# Patient Record
Sex: MALE | Race: WHITE
[De-identification: names, ages, dates, MRNs, and addresses within clinical notes are randomized per-mention and may not be internally consistent; named-entity substitution may affect disease eponyms.]

---

## 2018-07-23 ENCOUNTER — HOSPITAL ENCOUNTER (OUTPATIENT)
Dept: HOSPITAL 84 - D.MRI | Age: 78
Discharge: HOME | End: 2018-07-23
Attending: ORTHOPAEDIC SURGERY
Payer: MEDICARE

## 2018-07-23 VITALS — BODY MASS INDEX: 33 KG/M2

## 2018-07-23 DIAGNOSIS — M75.102: Primary | ICD-10-CM

## 2018-08-16 LAB
ERYTHROCYTE [DISTWIDTH] IN BLOOD BY AUTOMATED COUNT: 13.5 % (ref 11.5–14.5)
HCT VFR BLD CALC: 41.5 % (ref 42–54)
HGB BLD-MCNC: 14.2 G/DL (ref 13.5–17.5)
MCH RBC QN AUTO: 33.9 PG (ref 26–34)
MCHC RBC AUTO-ENTMCNC: 34.2 G/DL (ref 31–37)
MCV RBC: 99 FL (ref 80–100)
PLATELET # BLD: 205 10X3/UL (ref 130–400)
PMV BLD AUTO: 10.5 FL (ref 7.4–10.4)
RBC # BLD AUTO: 4.19 10X6/UL (ref 4.2–6.1)
WBC # BLD AUTO: 7.7 10X3/UL (ref 4.8–10.8)

## 2018-08-17 ENCOUNTER — HOSPITAL ENCOUNTER (OUTPATIENT)
Dept: HOSPITAL 84 - D.OPS | Age: 78
Discharge: HOME | End: 2018-08-17
Attending: ORTHOPAEDIC SURGERY
Payer: MEDICARE

## 2018-08-17 VITALS — DIASTOLIC BLOOD PRESSURE: 77 MMHG | SYSTOLIC BLOOD PRESSURE: 152 MMHG

## 2018-08-17 VITALS — WEIGHT: 230 LBS | HEIGHT: 70 IN | BODY MASS INDEX: 32.93 KG/M2 | BODY MASS INDEX: 32.93 KG/M2

## 2018-08-17 VITALS — SYSTOLIC BLOOD PRESSURE: 152 MMHG | DIASTOLIC BLOOD PRESSURE: 77 MMHG

## 2018-08-17 DIAGNOSIS — Z01.812: ICD-10-CM

## 2018-08-17 DIAGNOSIS — S43.432A: ICD-10-CM

## 2018-08-17 DIAGNOSIS — M13.812: ICD-10-CM
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
## 2018-08-17 DIAGNOSIS — M75.112: Primary | ICD-10-CM

## 2018-11-20 ENCOUNTER — HOSPITAL ENCOUNTER (OUTPATIENT)
Dept: HOSPITAL 84 - D.OPS | Age: 78
Discharge: HOME | End: 2018-11-20
Attending: UROLOGY
Payer: MEDICARE

## 2018-11-20 VITALS — DIASTOLIC BLOOD PRESSURE: 75 MMHG | SYSTOLIC BLOOD PRESSURE: 149 MMHG

## 2018-11-20 VITALS — WEIGHT: 237 LBS | BODY MASS INDEX: 33.93 KG/M2 | HEIGHT: 70 IN

## 2018-11-20 DIAGNOSIS — R97.20: Primary | ICD-10-CM

## 2018-11-20 DIAGNOSIS — I10: ICD-10-CM

## 2018-11-20 LAB
ERYTHROCYTE [DISTWIDTH] IN BLOOD BY AUTOMATED COUNT: 14.5 % (ref 11.5–14.5)
HCT VFR BLD CALC: 43.3 % (ref 42–54)
HGB BLD-MCNC: 14.2 G/DL (ref 13.5–17.5)
MCH RBC QN AUTO: 32.7 PG (ref 26–34)
MCHC RBC AUTO-ENTMCNC: 32.8 G/DL (ref 31–37)
MCV RBC: 99.8 FL (ref 80–100)
PLATELET # BLD: 219 10X3/UL (ref 130–400)
PMV BLD AUTO: 10.6 FL (ref 7.4–10.4)
RBC # BLD AUTO: 4.34 10X6/UL (ref 4.2–6.1)
WBC # BLD AUTO: 7.4 10X3/UL (ref 4.8–10.8)

## 2019-01-10 ENCOUNTER — HOSPITAL ENCOUNTER (OUTPATIENT)
Dept: HOSPITAL 84 - D.NM | Age: 79
Discharge: HOME | End: 2019-01-10
Attending: UROLOGY
Payer: MEDICARE

## 2019-01-10 VITALS — BODY MASS INDEX: 34 KG/M2

## 2019-01-10 DIAGNOSIS — C61: Primary | ICD-10-CM

## 2019-12-09 ENCOUNTER — HOSPITAL ENCOUNTER (OUTPATIENT)
Dept: HOSPITAL 84 - D.LABREF | Age: 79
Discharge: HOME | End: 2019-12-09
Attending: ORTHOPAEDIC SURGERY
Payer: MEDICARE

## 2019-12-09 VITALS — BODY MASS INDEX: 34 KG/M2

## 2019-12-09 DIAGNOSIS — M16.11: Primary | ICD-10-CM

## 2019-12-11 ENCOUNTER — HOSPITAL ENCOUNTER (INPATIENT)
Dept: HOSPITAL 84 - D.SDCHOLD | Age: 79
LOS: 24 days | Discharge: NURSING, REHAB OR LONG-TERM CARE | DRG: 469 | End: 2020-01-04
Attending: ORTHOPAEDIC SURGERY | Admitting: ORTHOPAEDIC SURGERY
Payer: MEDICARE

## 2019-12-11 VITALS
BODY MASS INDEX: 35.07 KG/M2 | WEIGHT: 245 LBS | BODY MASS INDEX: 35.07 KG/M2 | WEIGHT: 245 LBS | HEIGHT: 70 IN | HEIGHT: 70 IN | BODY MASS INDEX: 35.07 KG/M2

## 2019-12-11 DIAGNOSIS — D62: ICD-10-CM

## 2019-12-11 DIAGNOSIS — I10: ICD-10-CM

## 2019-12-11 DIAGNOSIS — J18.9: ICD-10-CM

## 2019-12-11 DIAGNOSIS — M16.11: Primary | ICD-10-CM

## 2019-12-23 LAB
APPEARANCE UR: CLEAR
BACTERIA #/AREA URNS HPF: (no result) /HPF
BILIRUB SERPL-MCNC: NEGATIVE MG/DL
COLOR UR: YELLOW
GLUCOSE SERPL-MCNC: NEGATIVE MG/DL
GRAN CASTS #/AREA URNS LPF: (no result) /LPF
HYALINE CASTS #/AREA URNS LPF: (no result) /LPF
KETONES UR STRIP-MCNC: NEGATIVE MG/DL
MUCOUS THREADS #/AREA URNS LPF: (no result) /LPF
NITRITE UR-MCNC: NEGATIVE MG/ML
PH UR STRIP: 5 [PH] (ref 5–6)
PROT UR-MCNC: (no result) MG/DL
RBC #/AREA URNS HPF: (no result) /HPF (ref 0–5)
SP GR UR STRIP: 1.02 (ref 1–1.02)
SQUAMOUS #/AREA URNS HPF: (no result) /HPF (ref 0–5)
UROBILINOGEN UR-MCNC: NORMAL MG/DL
WBC #/AREA URNS HPF: (no result) /HPF

## 2019-12-31 VITALS — SYSTOLIC BLOOD PRESSURE: 123 MMHG | DIASTOLIC BLOOD PRESSURE: 53 MMHG

## 2019-12-31 VITALS — SYSTOLIC BLOOD PRESSURE: 101 MMHG | DIASTOLIC BLOOD PRESSURE: 46 MMHG

## 2019-12-31 VITALS — DIASTOLIC BLOOD PRESSURE: 48 MMHG | SYSTOLIC BLOOD PRESSURE: 102 MMHG

## 2019-12-31 VITALS — SYSTOLIC BLOOD PRESSURE: 95 MMHG | DIASTOLIC BLOOD PRESSURE: 55 MMHG

## 2019-12-31 VITALS — DIASTOLIC BLOOD PRESSURE: 80 MMHG | SYSTOLIC BLOOD PRESSURE: 133 MMHG

## 2019-12-31 VITALS — SYSTOLIC BLOOD PRESSURE: 130 MMHG | DIASTOLIC BLOOD PRESSURE: 54 MMHG

## 2019-12-31 VITALS — SYSTOLIC BLOOD PRESSURE: 111 MMHG | DIASTOLIC BLOOD PRESSURE: 43 MMHG

## 2019-12-31 VITALS — DIASTOLIC BLOOD PRESSURE: 53 MMHG | SYSTOLIC BLOOD PRESSURE: 126 MMHG

## 2019-12-31 VITALS — SYSTOLIC BLOOD PRESSURE: 152 MMHG | DIASTOLIC BLOOD PRESSURE: 70 MMHG

## 2019-12-31 LAB
ANION GAP SERPL CALC-SCNC: 12.6 MMOL/L (ref 8–16)
APTT BLD: 28.4 SECONDS (ref 22.8–39.4)
BASOPHILS NFR BLD AUTO: 0.6 % (ref 0–2)
BUN SERPL-MCNC: 23 MG/DL (ref 7–18)
CALCIUM SERPL-MCNC: 8.9 MG/DL (ref 8.5–10.1)
CHLORIDE SERPL-SCNC: 109 MMOL/L (ref 98–107)
CO2 SERPL-SCNC: 26.5 MMOL/L (ref 21–32)
CREAT SERPL-MCNC: 1 MG/DL (ref 0.6–1.3)
EOSINOPHIL NFR BLD: 3.2 % (ref 0–7)
ERYTHROCYTE [DISTWIDTH] IN BLOOD BY AUTOMATED COUNT: 13.9 % (ref 11.5–14.5)
GLUCOSE SERPL-MCNC: 94 MG/DL (ref 74–106)
HCT VFR BLD CALC: 39.5 % (ref 42–54)
HGB BLD-MCNC: 13.1 G/DL (ref 13.5–17.5)
IMM GRANULOCYTES NFR BLD: 0.5 % (ref 0–5)
INR PPP: 1.1 (ref 0.85–1.17)
LYMPHOCYTES NFR BLD AUTO: 33.3 % (ref 15–50)
MCH RBC QN AUTO: 33.5 PG (ref 26–34)
MCHC RBC AUTO-ENTMCNC: 33.2 G/DL (ref 31–37)
MCV RBC: 101 FL (ref 80–100)
MONOCYTES NFR BLD: 8.5 % (ref 2–11)
NEUTROPHILS NFR BLD AUTO: 53.9 % (ref 40–80)
OSMOLALITY SERPL CALC.SUM OF ELEC: 290 MOSM/KG (ref 275–300)
PLATELET # BLD: 222 10X3/UL (ref 130–400)
PMV BLD AUTO: 10.5 FL (ref 7.4–10.4)
POTASSIUM SERPL-SCNC: 4.1 MMOL/L (ref 3.5–5.1)
PROTHROMBIN TIME: 13.7 SECONDS (ref 11.6–15)
RBC # BLD AUTO: 3.91 10X6/UL (ref 4.2–6.1)
SODIUM SERPL-SCNC: 144 MMOL/L (ref 136–145)
WBC # BLD AUTO: 6.6 10X3/UL (ref 4.8–10.8)

## 2019-12-31 PROCEDURE — 0SR90J9 REPLACEMENT OF RIGHT HIP JOINT WITH SYNTHETIC SUBSTITUTE, CEMENTED, OPEN APPROACH: ICD-10-PCS | Performed by: ORTHOPAEDIC SURGERY

## 2019-12-31 NOTE — NUR
WHEN GIVING PATIENT NIGHT MEDS. PATIENT STATES THAT IT IS NORMAL FOR HIS HEART
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
DOCTOR SAID THIS IS OKAY.

## 2019-12-31 NOTE — NUR
PATIENT LYING DOWN SLEEPING IN BED. AROUSES EASILY TO VOICE. DRESSING ON R HIP
C/D/I. L HAND IV WITH 1/2 NS @100, NO REDNESS, OR SWELLING. 3L OF O2. PATIENT
DENIES ANY NEEDS AT THIS TIME. CALL LIGHT AND BEDSIDE TABLE WITHIN REACH.

## 2019-12-31 NOTE — NUR
RCVED PT VIA HOSPITAL STAFF AND BED FROM RECOVERY ROOM. PT ALERT AND ORIENTED
WITH NO S/S OF DISTRESS AT THIS TIME, DENIES NEEDS, WILL CONT TO MONITOR.

## 2020-01-01 VITALS — SYSTOLIC BLOOD PRESSURE: 128 MMHG | DIASTOLIC BLOOD PRESSURE: 59 MMHG

## 2020-01-01 VITALS — DIASTOLIC BLOOD PRESSURE: 54 MMHG | SYSTOLIC BLOOD PRESSURE: 118 MMHG

## 2020-01-01 VITALS — SYSTOLIC BLOOD PRESSURE: 124 MMHG | DIASTOLIC BLOOD PRESSURE: 59 MMHG

## 2020-01-01 VITALS — DIASTOLIC BLOOD PRESSURE: 51 MMHG | SYSTOLIC BLOOD PRESSURE: 118 MMHG

## 2020-01-01 VITALS — SYSTOLIC BLOOD PRESSURE: 135 MMHG | DIASTOLIC BLOOD PRESSURE: 63 MMHG

## 2020-01-01 VITALS — SYSTOLIC BLOOD PRESSURE: 118 MMHG | DIASTOLIC BLOOD PRESSURE: 51 MMHG

## 2020-01-01 VITALS — SYSTOLIC BLOOD PRESSURE: 144 MMHG | DIASTOLIC BLOOD PRESSURE: 57 MMHG

## 2020-01-01 LAB
ANION GAP SERPL CALC-SCNC: 13 MMOL/L (ref 8–16)
APPEARANCE UR: CLEAR
BASOPHILS NFR BLD AUTO: 0.2 % (ref 0–2)
BILIRUB SERPL-MCNC: NEGATIVE MG/DL
BUN SERPL-MCNC: 25 MG/DL (ref 7–18)
CALCIUM SERPL-MCNC: 8.1 MG/DL (ref 8.5–10.1)
CHLORIDE SERPL-SCNC: 107 MMOL/L (ref 98–107)
CO2 SERPL-SCNC: 26.7 MMOL/L (ref 21–32)
COLOR UR: YELLOW
CREAT SERPL-MCNC: 1.1 MG/DL (ref 0.6–1.3)
EOSINOPHIL NFR BLD: 0.1 % (ref 0–7)
ERYTHROCYTE [DISTWIDTH] IN BLOOD BY AUTOMATED COUNT: 13.9 % (ref 11.5–14.5)
GLUCOSE SERPL-MCNC: 119 MG/DL (ref 74–106)
GLUCOSE SERPL-MCNC: NEGATIVE MG/DL
HCT VFR BLD CALC: 34.2 % (ref 42–54)
HGB BLD-MCNC: 11.1 G/DL (ref 13.5–17.5)
IMM GRANULOCYTES NFR BLD: 0.3 % (ref 0–5)
KETONES UR STRIP-MCNC: NEGATIVE MG/DL
LYMPHOCYTES NFR BLD AUTO: 12.3 % (ref 15–50)
MAGNESIUM SERPL-MCNC: 1.9 MG/DL (ref 1.8–2.4)
MCH RBC QN AUTO: 33 PG (ref 26–34)
MCHC RBC AUTO-ENTMCNC: 32.5 G/DL (ref 31–37)
MCV RBC: 101.8 FL (ref 80–100)
MONOCYTES NFR BLD: 10.7 % (ref 2–11)
NEUTROPHILS NFR BLD AUTO: 76.4 % (ref 40–80)
NITRITE UR-MCNC: NEGATIVE MG/ML
OSMOLALITY SERPL CALC.SUM OF ELEC: 287 MOSM/KG (ref 275–300)
PH UR STRIP: 5 [PH] (ref 5–6)
PHOSPHATE SERPL-MCNC: 4.1 MG/DL (ref 2.5–4.9)
PLATELET # BLD: 218 10X3/UL (ref 130–400)
PMV BLD AUTO: 10.8 FL (ref 7.4–10.4)
POTASSIUM SERPL-SCNC: 4.7 MMOL/L (ref 3.5–5.1)
PROT UR-MCNC: (no result) MG/DL
RBC # BLD AUTO: 3.36 10X6/UL (ref 4.2–6.1)
SODIUM SERPL-SCNC: 142 MMOL/L (ref 136–145)
SP GR UR STRIP: 1.02 (ref 1–1.02)
UROBILINOGEN UR-MCNC: NORMAL MG/DL
WBC # BLD AUTO: 11.2 10X3/UL (ref 4.8–10.8)

## 2020-01-01 NOTE — NUR
CALLL LIGHT ANSWERED. PT DROPPED URINAL ON GROUND. PLACED IT BACK WITHIN
REACH. DENIES FURTHER NEEDS OR PAIN AT THIS TIME. CALL LIGHT ALSO WITHIN
REACH. BED IN LOWEST POSITION. PT REPOSITIONED TO COMFORT. WILL CONTINUE TO
MONITOR.

## 2020-01-01 NOTE — NUR
PT ASSISTED BACK TO BED WITH WALKER. STEADY GAIT NOTED. WATER RECIEVED PER
REQUEST. DENIES FURTHER NEEDS OR PAIN AT THIS TIME. REPOSITIONED TO COMFORT.
RECIVED FRESH ICE PACK FOR HIP. CALL LIGHT WITHIN REACH. BED IN LOWEST
POSITION.WILL CONTINUE TO MONITOR.

## 2020-01-01 NOTE — NUR
LYING IN BED. ALERT AND ORIENTED X4. RATES PAIN IN RT HIP AS 1 UNLESS MOVING
AND THEN HE STATES ITS A 10. DENIES NEED FOR PAIN MED. DRSG C/D/I TO RT HIP.
RESP NONLABORED. O2 @ 3L/NC. SCDS IN USE BILAT. [place]. 1/2 NS @ 50 MLHR INFUSING
IN LT HAND. ABD DISTENDED. HX OF ALCOHOL USE. NO DISTRESS. POSEY ALARM ON. SR
ELEVATED X2. CL IN REACH.

## 2020-01-01 NOTE — OP
PATIENT NAME:  LACEY AVINA                   MEDICAL RECORD: [id number]
:44                                             LOCATION:D.MS [id number]
                                                         ADMISSION DATE:19
SURGEON:  BACILIO TRIMBLE DO         
 
 
DATE OF OPERATION:  2019
 
PROCEDURE PERFORMED:  Right total hip arthroplasty.
 
PREOPERATIVE DIAGNOSIS:  Right hip osteoarthritis.
 
POSTOPERATIVE DIAGNOSIS:  Right hip osteoarthritis.
 
INDICATIONS:  Mr. Avina is a 79-year-old male who has had right hip pain
for quite some time and is tired of dealing with it and had good luck with
injections in the past and wants something done surgically.  He is tired of it
affecting his activities of daily living and wants something done.  I informed
her of the risks including infection, bleeding, damage to nerves and vessels,
anterior lateral thigh numbness, fracture, need for further surgery, failure of
implants, blood clots, and even death and he signed the consent.
 
SURGEON:  Bacilio Trimble DO
 
ASSISTANT:  Assisted by Prabhjot Lanier, advanced nurse practitioner and Zaid Aguero, certified surgical first assistant, both assisted with retraction and
closing.  The surgery could not have been performed without their assistance.
 
DESCRIPTION OF THE PROCEDURE:  The patient was taken to the operative suite,
laid in supine position, given 80 mg gentamicin and 2 grams of Ancef.  He was
then sedated and intubated and then moved over to the Paisley table.  The right hip
was prepped and draped in sterile fashion.  Timeout was performed and everybody
was in agreement with the correct side, site, patient, and procedure.
 
Incision was then marked out and cut down to the fascia and tensor fasciae latae
muscle.  Fascia was taken anteriorly with the muscle belly posteriorly.  I
opened up the rectus interval and fascia was then opened, then the rectus was
taken medially and the tensor fascia laterally.  The ascending branches of the
lateral femoral circumflex were encountered, tied off, and coagulated with
Aquamantys.  We then put a Hohmann around the neck of the femur.  The capsule
was then opened and the neck cut was made.  This was then removed.  The head of
the femur was then removed.  Labrum was removed off the acetabulum as well as
the Pulvinar and the inner portion of it and then reamed first with a #48 and up
to a #54.  We needed to medialize some more, we then went back with a #50 to
medialize up to a 54.  A #54 cup was then impacted into place.  The liner was
put in.  The femur was exposed and found the canal with the canal finder and
then the cookie cutter posteriorly.  The broaching began up to a 13.  This was
trialed and then fit well with a -3 neck.  This was taken out.  The #13 was a
little loose.  A #14 was put down, #14 fit very well, #14 stem was then placed
and -3 neck was put on with a high offset.  This was then reduced and had good
length and fit the femur very well.  The site was then irrigated thoroughly and
then the povidone-iodine solution 7% with 500 mL normal saline was introduced
through the site and was left for 3 minutes and irrigated out with a liter of
normal saline and then put tobramycin and vancomycin powder as well as Ernestina
powder in the site and closed the tensor fascia val fascia with #1 Vicryl,
first in a figure-of-eight and then a running locking stitch.  The skin was
closed with 2-0 Vicryl in an inverted interrupted fashion and 4-0 Monocryl to
the skin.  Prineo glue on the skin and dressed with Telfa and Tegaderm.  He was
 
 
 
OPERATIVE REPORT                               [id number]    LACEY AVINA 
 
 
awakened and taken to the recovery in stable condition.
 
Blood loss was approximately 200 mL.
 
COMPLICATIONS:  None.
 
TRANSINT:[id number] Voice Confirmation ID: [id number] DOCUMENT ID: [id number]
                                           
                                           BACILIO TRIMBLE DO         
 
 
 
Electronically Signed by BACILIO MUELLER on 20 at 1107
 
 
 
 
 
 
 
 
 
 
 
 
 
 
 
 
 
 
 
 
 
 
 
 
 
 
 
 
 
 
 
 
 
 
CC:                                                             [id number]
DICTATION DATE: 19 1350     :     19 0134      ADM IN  
                                                                              
[place] [id number] [place] [id number]

## 2020-01-01 NOTE — NUR
PT RESTING, RR EVEN AND UNLABORED. DENIES NEEDS OR PAIN AT THIS TIME. APN AT
BEDSIDE. CALL LIGHT WITHIN REACH. BED IN LOWEST POSITION. WILL CONTINUE TO
MONITOR.

## 2020-01-02 VITALS — DIASTOLIC BLOOD PRESSURE: 61 MMHG | SYSTOLIC BLOOD PRESSURE: 129 MMHG

## 2020-01-02 VITALS — SYSTOLIC BLOOD PRESSURE: 135 MMHG | DIASTOLIC BLOOD PRESSURE: 57 MMHG

## 2020-01-02 VITALS — SYSTOLIC BLOOD PRESSURE: 131 MMHG | DIASTOLIC BLOOD PRESSURE: 55 MMHG

## 2020-01-02 VITALS — DIASTOLIC BLOOD PRESSURE: 50 MMHG | SYSTOLIC BLOOD PRESSURE: 137 MMHG

## 2020-01-02 LAB
ANION GAP SERPL CALC-SCNC: 14.7 MMOL/L (ref 8–16)
BASOPHILS NFR BLD AUTO: 0.2 % (ref 0–2)
BUN SERPL-MCNC: 18 MG/DL (ref 7–18)
CALCIUM SERPL-MCNC: 8 MG/DL (ref 8.5–10.1)
CHLORIDE SERPL-SCNC: 106 MMOL/L (ref 98–107)
CO2 SERPL-SCNC: 24.1 MMOL/L (ref 21–32)
CREAT SERPL-MCNC: 0.9 MG/DL (ref 0.6–1.3)
EOSINOPHIL NFR BLD: 1 % (ref 0–7)
ERYTHROCYTE [DISTWIDTH] IN BLOOD BY AUTOMATED COUNT: 14.2 % (ref 11.5–14.5)
GLUCOSE SERPL-MCNC: 109 MG/DL (ref 74–106)
HCT VFR BLD CALC: 33 % (ref 42–54)
HGB BLD-MCNC: 10.6 G/DL (ref 13.5–17.5)
IMM GRANULOCYTES NFR BLD: 0.3 % (ref 0–5)
LYMPHOCYTES NFR BLD AUTO: 13.7 % (ref 15–50)
MAGNESIUM SERPL-MCNC: 1.9 MG/DL (ref 1.8–2.4)
MCH RBC QN AUTO: 32.6 PG (ref 26–34)
MCHC RBC AUTO-ENTMCNC: 32.1 G/DL (ref 31–37)
MCV RBC: 101.5 FL (ref 80–100)
MONOCYTES NFR BLD: 13 % (ref 2–11)
NEUTROPHILS NFR BLD AUTO: 71.8 % (ref 40–80)
OSMOLALITY SERPL CALC.SUM OF ELEC: 283 MOSM/KG (ref 275–300)
PHOSPHATE SERPL-MCNC: 2.8 MG/DL (ref 2.5–4.9)
PLATELET # BLD: 193 10X3/UL (ref 130–400)
PMV BLD AUTO: 10.8 FL (ref 7.4–10.4)
POTASSIUM SERPL-SCNC: 3.8 MMOL/L (ref 3.5–5.1)
RBC # BLD AUTO: 3.25 10X6/UL (ref 4.2–6.1)
SODIUM SERPL-SCNC: 141 MMOL/L (ref 136–145)
WBC # BLD AUTO: 12.1 10X3/UL (ref 4.8–10.8)

## 2020-01-02 NOTE — NUR
ALERT AND ORIENTED, RESTING IN BED WITH EYES OPEN. NO C/O PAIN. NO S/S OF
ACUTE DISTRESS NOTED. AGITATED THIS MORNING. [place]. POD #2 RIGHT HIP, DRESSING
C/D/I. UP WITH WALKER. ON 3L O2, NC. IV TO LEFT HAND, 1/2 NS INFUSING @
50ML/HR. SITE PATENT WITHOUT REDNESS OR SWELLING. POSEY ALARM ON. SCDS
PRESENT. DENIES ANY NEEDS AT THIS TIME. CALL LIGHT IN REACH. WILL CONTINUE TO
MONITOR.

## 2020-01-02 NOTE — NUR
ALERT AND ORIENTED X4. LYING IN BED. IRRITABLE AT TIMES, DOESNT LIKE BEING
DISTURBED. RESP NONLABORED. O2 @2LNC. SCDS IN USE BILAT. DRSG NOTED TO RT HIP
IS C/D/I. [place]. POSEY ALARM ON FOR PT SAFETY. 1/2 NS @ 30 MLHR INFUSING IN LT
HAND. AMB WITH WALKER WITH ASSIST X1. DENIES PAIN AT THIS TIME. CL IN REACH.

## 2020-01-02 NOTE — NUR
ALERT AND ORIENTED, RESTING IN BED WITH EYES OPEN. NO C/O PAIN. NO S/S OF
ACUTE DISTRESS NOTED. DENIES ANY NEEDS AT THIS TIME. CALL LIGHT IN REACH. WILL
CONTINUE TO MONITOR.

## 2020-01-03 VITALS — DIASTOLIC BLOOD PRESSURE: 61 MMHG | SYSTOLIC BLOOD PRESSURE: 124 MMHG

## 2020-01-03 VITALS — DIASTOLIC BLOOD PRESSURE: 62 MMHG | SYSTOLIC BLOOD PRESSURE: 133 MMHG

## 2020-01-03 VITALS — DIASTOLIC BLOOD PRESSURE: 65 MMHG | SYSTOLIC BLOOD PRESSURE: 127 MMHG

## 2020-01-03 VITALS — DIASTOLIC BLOOD PRESSURE: 61 MMHG | SYSTOLIC BLOOD PRESSURE: 133 MMHG

## 2020-01-03 VITALS — DIASTOLIC BLOOD PRESSURE: 64 MMHG | SYSTOLIC BLOOD PRESSURE: 136 MMHG

## 2020-01-03 LAB
ANION GAP SERPL CALC-SCNC: 12.8 MMOL/L (ref 8–16)
BASOPHILS NFR BLD AUTO: 0.1 % (ref 0–2)
BUN SERPL-MCNC: 20 MG/DL (ref 7–18)
CALCIUM SERPL-MCNC: 8.4 MG/DL (ref 8.5–10.1)
CHLORIDE SERPL-SCNC: 107 MMOL/L (ref 98–107)
CO2 SERPL-SCNC: 26 MMOL/L (ref 21–32)
CREAT SERPL-MCNC: 1.1 MG/DL (ref 0.6–1.3)
EOSINOPHIL NFR BLD: 2.2 % (ref 0–7)
ERYTHROCYTE [DISTWIDTH] IN BLOOD BY AUTOMATED COUNT: 14.1 % (ref 11.5–14.5)
FERRITIN SERPL-MCNC: 211 NG/ML (ref 3–244)
GLUCOSE SERPL-MCNC: 109 MG/DL (ref 74–106)
HCT VFR BLD CALC: 31.2 % (ref 42–54)
HGB BLD-MCNC: 10.1 G/DL (ref 13.5–17.5)
IMM GRANULOCYTES NFR BLD: 0.3 % (ref 0–5)
IRON SERPL-MCNC: 16 UG/DL (ref 35–150)
LYMPHOCYTES NFR BLD AUTO: 21.4 % (ref 15–50)
MAGNESIUM SERPL-MCNC: 2 MG/DL (ref 1.8–2.4)
MCH RBC QN AUTO: 33 PG (ref 26–34)
MCHC RBC AUTO-ENTMCNC: 32.4 G/DL (ref 31–37)
MCV RBC: 102 FL (ref 80–100)
MONOCYTES NFR BLD: 13.1 % (ref 2–11)
NEUTROPHILS NFR BLD AUTO: 62.9 % (ref 40–80)
OSMOLALITY SERPL CALC.SUM OF ELEC: 286 MOSM/KG (ref 275–300)
PHOSPHATE SERPL-MCNC: 2.8 MG/DL (ref 2.5–4.9)
PLATELET # BLD: 193 10X3/UL (ref 130–400)
PMV BLD AUTO: 10.7 FL (ref 7.4–10.4)
POTASSIUM SERPL-SCNC: 3.8 MMOL/L (ref 3.5–5.1)
RBC # BLD AUTO: 3.06 10X6/UL (ref 4.2–6.1)
SAO2 % BLD FROM PO2: 9 % (ref 15–55)
SODIUM SERPL-SCNC: 142 MMOL/L (ref 136–145)
TIBC SERPL-MCNC: 174 UG/DL (ref 260–445)
UIBC SERPL-MCNC: 158 UG/DL (ref 150–375)
WBC # BLD AUTO: 11 10X3/UL (ref 4.8–10.8)

## 2020-01-03 NOTE — NUR
PT SITTING UP IN BED WITHOUT DISTRESS, AOX4. IV LEFT HAND INFUSING 1/2NS @ 50.
O2 2L/NC. SCDS IN PLACE BILAT. BED ALARM ON. DENIES NEEDS AT THIS TIME. CL IN
REACH, WILL CTM

## 2020-01-03 NOTE — MORECARE
CASE MANAGEMENT DISCHARGE SUMMARY
 
 
PATIENT: LACEY AVINA             UNIT: [id number]
ACCOUNT#: [id number]                       ADM DATE: 19
AGE: 79     : 44  SEX: M            ROOM/BED: D.2208    
AUTHOR: BE STARKS                             PHYSICIAN:                               
 
REFERRING PHYSICIAN: CECY TRIMBLE DO         
DATE OF SERVICE: 20
Discharge Plan
 
 
Patient Name: LACEY AVINA
Facility: [facility]:[place]
Encounter #: [id number]
Medical Record #: [id number]
: 1940
Planned Disposition: Inpatient Rehab
Anticipated Discharge Date: 1/3/20
 
Discharge Date: 
Expected LOS: 3
Initial Reviewer: [email or web address]
Initial Review Date: 2020
Generated: 1/3/20  12:54 pm 
Comments
 
DCP- Discharge Planning
 
Updated by [id number]: Marjorie Sotelo on 1/3/20  10:47 am CT
Patient Name: LACEY AVINA                                     
Admission Status: Elective   
Accout number: [id number]                              
Admission Date: 2019   
: 1940                                                        
Admission Diagnosis:   
Attending: CECY TRIMBLE                                                
Current LOS:  3   
  
Anticipated DC Date: 2020   
Planned Disposition: Inpatient Rehab   
Primary Insurance: MEDICARE A & B   
  
  
Discharge Planning Comments: CM met with patient to complete initial dc 
planning assessment.  CM educated patient on the CM role and verbal consent 
given by patient to complete assessment.   Patient lives at home with his 
spouse. He states he is independent with all ADL's and AIDL's. States he 
wears oxygen at night, receives oxygen supplies from [facility] CM discussed 
 
availability of home health, rehab services, and medical equipment. Patient 
states he would like to go to inpatient rehab at [facility]. I have placed a rehab 
screen and spoke with [facility]. [facility] states they can accept today if 
discharged. I messaged Dr. Trimble of the same. CM will continue to follow 
and will assist as needed with dc plans/needs.    
  
  
  
  
  
  
: Marjorie Sotelo
 DCPIA - Discharge Planning Initial Assessment
 
Updated by [id number]: Marjorie Sotelo on 1/3/20  11:45 am
*  Is the patient Alert and Oriented?
Yes
*  How many steps to enter\exit or inside your home? 0/0 *  PCP Dr. Woodard *  Pharmacy
[facility] [address]
*  Preadmission Environment
Home with Family
*  ADLs
Independent
*  Equipment
Oxygen
*  List name and contact numbers for known caregivers / representatives who 
currently or will assist patient after discharge:
Meaghan Avina - spouse - [phone]
*  Verbal permission to speak to the caregivers and representatives has been 
obtained from the patient.
Yes
*  Community resources currently utilized
None
*  Additional services required to return to the preadmission environment?
Yes
*  Can the patient safely return to the preadmission environment?
Yes
*  Has this patient been hospitalized within the prior 30 days at any 
hospital?
No
 
 
 
 
 
Coverage Notice
 
Reviewer: ZEUS Sotelo
 
Notice Issued Date-Time: 2020  11:47
Notice Type: IM Discharge Notice
 
 
Notice Delivered To: Patient
Relationship to Patient: Self
Representative Name: 
 
Delivery Method: HAND - Hand Delivered
Jeanne Days:
Prior Verbal Notification: 
 
Recipient Understood Notice: Yes
Recipient Signature: Yes
Med Rec Note Co-signed by Attending:
 
Coverage Notice Comment:  IMM explained, signed, given, copy placed in Mr
Reviewer: [id number] Sotelo
 
Notice Issued Date-Time: 2020  11:47
Notice Type: Patient Choice Letter
 
Notice Delivered To: Patient
Relationship to Patient: Self
Representative Name: 
 
Delivery Method: HAND - Hand Delivered
Jeanne Days:
Prior Verbal Notification: 
 
Recipient Understood Notice: Yes
Recipient Signature: Yes
Med Rec Note Co-signed by Attending:
 
Coverage Notice Comment:  [facility] for [facility] inpatient rehab
 
Last DP export: 1/3/20  10:47 am
Patient Name: LACEY AVINA
 
Encounter #: [id number]
Page [id number]
 
 
 
 
 
Electronically Signed by BE STARKS on 20 at 1155
 
 
 
 
 
 
**All edits/amendments must be made on the electronic document**
 
DICTATION DATE: 20 1154     : JESSICA  20 1154     
RPT#: [id number]                                DC DATE:        
                                               STATUS: ADM IN  
[facility]
[address] [place] [address]
***END OF REPORT***

## 2020-01-03 NOTE — MORECARE
CASE MANAGEMENT DISCHARGE SUMMARY
 
 
PATIENT: LACEY LANDON             UNIT: [id number]
ACCOUNT#: [id number]                       ADM DATE: 19
AGE: 79     : 44  SEX: M            ROOM/BED: D.2208    
AUTHOR: BE STARKS                             PHYSICIAN:                               
 
REFERRING PHYSICIAN: CECY TRIMBLE DO         
DATE OF SERVICE: 20
Discharge Plan
 
 
Patient Name: LACEY LANDON
[facility]
Encounter #: [id number]
Medical Record #: [id number]
: 1940
Planned Disposition: Inpatient Rehab
Anticipated Discharge Date: 1/3/20
 
Discharge Date: 
Expected LOS: 3
Initial Reviewer: [email or web address]
Initial Review Date: 2020
Generated: 1/3/20  12:40 pm 
  
 
 
 
 
 
 
 
Patient Name: LACEY LANDON
 
Encounter #: [id number]
Page [id number]
 
 
 
 
 
Electronically Signed by BE STARKS on 20 at 1140
 
 
 
 
 
 
**All edits/amendments must be made on the electronic document**
 
DICTATION DATE: 20 1140     : JESSICA  20 1140     
RPT#: [id number]                                DC DATE:        
                                               STATUS: ADM IN  
[facility]
 [place] [address]
***END OF REPORT***

## 2020-01-03 NOTE — MORECARE
CASE MANAGEMENT DISCHARGE SUMMARY
 
 
PATIENT: LACEY AVINA             UNIT: [id number]
ACCOUNT#: [id number]                       ADM DATE: 19
AGE: 79     : 44  SEX: M            ROOM/BED: D.2208    
AUTHOR: BE STARKS                             PHYSICIAN:                               
 
REFERRING PHYSICIAN: CECY TRIMBLE DO         
DATE OF SERVICE: 20
Discharge Plan
 
 
Patient Name: LACEY AVINA
Facility: [facility]
Encounter #: [id number]
Medical Record #: [id number]
: 1940
Planned Disposition: Inpatient Rehab
Anticipated Discharge Date: 1/3/20
 
Discharge Date: 
Expected LOS: 3
Initial Reviewer: [email or web address]
Initial Review Date: 2020
Generated: 1/3/20   5:35 pm 
Comments
 
DCP- Discharge Planning
 
Updated by [id number]: Marjorie Sotelo on 1/3/20   3:24 pm CT
I spoke with [facility] in inpatient rehab, they will accept the patient tomorrow.
 Anticipate discharge tomorrow to inpatient rehab.
DCP- Discharge Planning
 
Updated by [id number]: Marjorie Sotelo on 1/3/20  10:47 am CT
Patient Name: LACEY AVINA                                     
Admission Status: Elective   
Accout number: [id number]                              
Admission Date: 2019   
: 1940                                                        
Admission Diagnosis:   
Attending: CECY TRIMBLE                                                
Current LOS:  3   
  
Anticipated DC Date: 2020   
Planned Disposition: Inpatient Rehab   
Primary Insurance: MEDICARE A & B   
  
  
 
Discharge Planning Comments: CM met with patient to complete initial dc 
planning assessment.  CM educated patient on the CM role and verbal consent 
given by patient to complete assessment.   Patient lives at home with his 
spouse. He states he is independent with all ADL's and AIDL's. States he 
wears oxygen at night, receives oxygen supplies from [facility] CM discussed 
availability of home health, rehab services, and medical equipment. Patient 
states he would like to go to inpatient rehab at [facility]. I have placed a rehab 
screen and spoke with [facility]. [facility] states they can accept today if 
discharged. I messaged Dr. Trimble of the same. CM will continue to follow 
and will assist as needed with dc plans/needs.    
  
  
  
  
  
  
: Marjorie Sotelo
 DCPIA - Discharge Planning Initial Assessment
 
Updated by [id number]: Marjorie Sotelo on 1/3/20  11:45 am
*  Is the patient Alert and Oriented?
Yes
*  How many steps to enter\exit or inside your home? 0/0 *  PCP Dr. Woodard *  Pharmacy
[facility] [address]
*  Preadmission Environment
Home with Family
*  ADLs
Independent
*  Equipment
Oxygen
*  List name and contact numbers for known caregivers / representatives who 
currently or will assist patient after discharge:
Meaghan Avina - [facility] - [phone]
*  Verbal permission to speak to the caregivers and representatives has been 
obtained from the patient.
Yes
*  Community resources currently utilized
None
*  Additional services required to return to the preadmission environment?
Yes
*  Can the patient safely return to the preadmission environment?
Yes
*  Has this patient been hospitalized within the prior 30 days at any 
hospital?
No
 
 
 
 
 
Coverage Notice
 
 
Reviewer: [email or web address] Leslie Sotelo
 
Notice Issued Date-Time: 2020  11:47
Notice Type: IM Discharge Notice
 
Notice Delivered To: Patient
Relationship to Patient: Self
Representative Name: 
 
Delivery Method: HAND - Hand Delivered
Jeanne Days:
Prior Verbal Notification: 
 
Recipient Understood Notice: Yes
Recipient Signature: Yes
Med Rec Note Co-signed by Attending:
 
Coverage Notice Comment:  IMM explained, signed, given, copy placed in Mr
Reviewer: [email or web address] Leslie Perezy Deepak
 
Notice Issued Date-Time: 2020  11:47
Notice Type: Patient Choice Letter
 
Notice Delivered To: Patient
Relationship to Patient: Self
Representative Name: 
 
Delivery Method: HAND - Hand Delivered
Jeanne Days:
Prior Verbal Notification: 
 
Recipient Understood Notice: Yes
Recipient Signature: Yes
Med Rec Note Co-signed by Attending:
 
Coverage Notice Comment:  [facility] for [facility] inpatient rehab
 
Last DP export: 1/3/20  10:55 am
Patient Name: LACEY AVINA
 
Encounter #: [id number]
Page [id number]
 
 
 
 
 
Electronically Signed by BE STARKS on 20 at 1635
 
 
 
 
 
 
**All edits/amendments must be made on the electronic document**
 
DICTATION DATE: 20 1635     : JESSICA  20 1635     
RPT#: [id number]                                DC DATE:        
                                               STATUS: ADM IN  
[facility]
[address] [place] [address]
***END OF REPORT***

## 2020-01-03 NOTE — MORECARE
CASE MANAGEMENT DISCHARGE SUMMARY
 
 
PATIENT: LACEY AVINA             UNIT: [id number]
ACCOUNT#: [id number]                       ADM DATE: 19
AGE: 79     : 44  SEX: M            ROOM/BED: D.2208    
AUTHOR: BE STARKS                             PHYSICIAN:                               
 
REFERRING PHYSICIAN: CECY TRIMBLE DO         
DATE OF SERVICE: 20
Discharge Plan
 
 
Patient Name: LACEY AVINA
[facility]
Encounter #: [id number]
Medical Record #: [id number]
: 1940
Planned Disposition: Inpatient Rehab
Anticipated Discharge Date: 1/3/20
 
Discharge Date: 
Expected LOS: 3
Initial Reviewer: [email or web address]
Initial Review Date: 2020
Generated: 1/3/20  12:46 pm 
 DCPIA - Discharge Planning Initial Assessment
 
Updated by [id number]: Marjorie Sotelo on 1/3/20  11:45 am
*  Is the patient Alert and Oriented?
Yes
*  How many steps to enter\exit or inside your home? 0/0 *  PCP Dr. Woodard *  Pharmacy
[facility] on [address]
*  Preadmission Environment
Home with Family
*  ADLs
Independent
*  Equipment
Oxygen
*  List name and contact numbers for known caregivers / representatives who 
currently or will assist patient after discharge:
Meaghan Avina - spouse - [phone]
*  Verbal permission to speak to the caregivers and representatives has been 
obtained from the patient.
Yes
*  Community resources currently utilized
None
*  Additional services required to return to the preadmission environment?
Yes
*  Can the patient safely return to the preadmission environment?
 
Yes
*  Has this patient been hospitalized within the prior 30 days at any 
hospital?
No
 
 
 
 
 
 
 
Last DP export: 1/3/20  10:40 am
Patient Name: LACEY AVINA
Encounter #: [id number]
Page [id number]
 
 
 
 
 
Electronically Signed by BE STARKS on 20 at 1147
 
 
 
 
 
 
**All edits/amendments must be made on the electronic document**
 
DICTATION DATE: 20 1146     : JESSICA  20 1146     
RPT#: [id number]                                DC DATE:        
                                               STATUS: ADM IN  
[facility]
[address] [place] [address]
***END OF REPORT***

## 2020-01-03 NOTE — NUR
RESTING IN BED, NO DISTRESS NOTED, SPILLED URINAL IN BED, DIFFICULT TO TURN,
CONT TO MONITOR, DRESSING DRY AND INTACT TO RIGHT HIP

## 2020-01-03 NOTE — NUR
PT REQUESTED PAIN MED FOR PAIN 6/10 IN HIP, GAVE NORCO AS ORDERED. DENIES
OTHER NEEDS. CL IN REACH, WILL CTM

## 2020-01-04 ENCOUNTER — HOSPITAL ENCOUNTER (INPATIENT)
Dept: HOSPITAL 84 - D.REHAB | Age: 80
LOS: 10 days | Discharge: HOME HEALTH | DRG: 559 | End: 2020-01-14
Attending: FAMILY MEDICINE | Admitting: FAMILY MEDICINE
Payer: MEDICARE

## 2020-01-04 VITALS
WEIGHT: 245 LBS | BODY MASS INDEX: 35.07 KG/M2 | HEIGHT: 70 IN | BODY MASS INDEX: 35.07 KG/M2 | HEIGHT: 70 IN | BODY MASS INDEX: 35.07 KG/M2 | WEIGHT: 245 LBS

## 2020-01-04 VITALS — DIASTOLIC BLOOD PRESSURE: 67 MMHG | SYSTOLIC BLOOD PRESSURE: 136 MMHG

## 2020-01-04 VITALS — DIASTOLIC BLOOD PRESSURE: 60 MMHG | SYSTOLIC BLOOD PRESSURE: 154 MMHG

## 2020-01-04 VITALS — SYSTOLIC BLOOD PRESSURE: 143 MMHG | DIASTOLIC BLOOD PRESSURE: 95 MMHG

## 2020-01-04 DIAGNOSIS — E87.70: ICD-10-CM

## 2020-01-04 DIAGNOSIS — R73.9: ICD-10-CM

## 2020-01-04 DIAGNOSIS — R09.02: ICD-10-CM

## 2020-01-04 DIAGNOSIS — J18.1: ICD-10-CM

## 2020-01-04 DIAGNOSIS — D62: ICD-10-CM

## 2020-01-04 DIAGNOSIS — E46: ICD-10-CM

## 2020-01-04 DIAGNOSIS — E87.8: ICD-10-CM

## 2020-01-04 DIAGNOSIS — R63.0: ICD-10-CM

## 2020-01-04 DIAGNOSIS — I49.9: ICD-10-CM

## 2020-01-04 DIAGNOSIS — I82.409: ICD-10-CM

## 2020-01-04 DIAGNOSIS — Z96.641: ICD-10-CM

## 2020-01-04 DIAGNOSIS — G47.33: ICD-10-CM

## 2020-01-04 DIAGNOSIS — M16.11: ICD-10-CM

## 2020-01-04 DIAGNOSIS — Z91.81: ICD-10-CM

## 2020-01-04 DIAGNOSIS — Z47.1: Primary | ICD-10-CM

## 2020-01-04 DIAGNOSIS — F41.8: ICD-10-CM

## 2020-01-04 DIAGNOSIS — I16.0: ICD-10-CM

## 2020-01-04 DIAGNOSIS — R06.03: ICD-10-CM

## 2020-01-04 DIAGNOSIS — E86.0: ICD-10-CM

## 2020-01-04 LAB
ANION GAP SERPL CALC-SCNC: 12.7 MMOL/L (ref 8–16)
BASOPHILS NFR BLD AUTO: 0.1 % (ref 0–2)
BUN SERPL-MCNC: 20 MG/DL (ref 7–18)
CALCIUM SERPL-MCNC: 8.7 MG/DL (ref 8.5–10.1)
CHLORIDE SERPL-SCNC: 108 MMOL/L (ref 98–107)
CO2 SERPL-SCNC: 25.2 MMOL/L (ref 21–32)
CREAT SERPL-MCNC: 1 MG/DL (ref 0.6–1.3)
EOSINOPHIL NFR BLD: 4 % (ref 0–7)
ERYTHROCYTE [DISTWIDTH] IN BLOOD BY AUTOMATED COUNT: 14.1 % (ref 11.5–14.5)
GLUCOSE SERPL-MCNC: 102 MG/DL (ref 74–106)
HCT VFR BLD CALC: 31.5 % (ref 42–54)
HGB BLD-MCNC: 10.2 G/DL (ref 13.5–17.5)
IMM GRANULOCYTES NFR BLD: 0.5 % (ref 0–5)
LYMPHOCYTES NFR BLD AUTO: 21.8 % (ref 15–50)
MAGNESIUM SERPL-MCNC: 2.1 MG/DL (ref 1.8–2.4)
MCH RBC QN AUTO: 33.1 PG (ref 26–34)
MCHC RBC AUTO-ENTMCNC: 32.4 G/DL (ref 31–37)
MCV RBC: 102.3 FL (ref 80–100)
MONOCYTES NFR BLD: 12.6 % (ref 2–11)
NEUTROPHILS NFR BLD AUTO: 61 % (ref 40–80)
OSMOLALITY SERPL CALC.SUM OF ELEC: 285 MOSM/KG (ref 275–300)
PHOSPHATE SERPL-MCNC: 3.3 MG/DL (ref 2.5–4.9)
PLATELET # BLD: 213 10X3/UL (ref 130–400)
PMV BLD AUTO: 10.7 FL (ref 7.4–10.4)
POTASSIUM SERPL-SCNC: 3.9 MMOL/L (ref 3.5–5.1)
RBC # BLD AUTO: 3.08 10X6/UL (ref 4.2–6.1)
SODIUM SERPL-SCNC: 142 MMOL/L (ref 136–145)
WBC # BLD AUTO: 9.7 10X3/UL (ref 4.8–10.8)

## 2020-01-04 NOTE — NUR
QUIET HOURS. PT LYING IN BED EYES CLOSED RESTING QUIETLY. RR EVEN AND
UNLABORED. CONTINUES ON 2L VIA NC ONLY AT HS D/T SLEEP APNEA. CL IN REACH

## 2020-01-04 NOTE — NUR
BEDSIDE REPORT COMPLETE. PT SITTING UP IN BED AWAKE AND ALERT/ORIENTED X4.
DENIES ANY NEEDS OR PAIN. NO SIGNS OF ACUTE DISTRESS NOTED. RT ANTERIOR HIP
INCISION GLUED WITHOUT REDNESS OR SWELLING. RUPTURED BLISTER WITHOUT DRAINAGE
ON RIGHT LATERAL SIDE OF HIP OPEN TO AIR. VS STABLE. SHIFT ASSESSMENT
COMPLETE. CL IN REACH. FALL PRECAUTIONS IN PLACE. WILL CONTINUE TO MONITOR

## 2020-01-04 NOTE — NUR
ALERT AND ORIENTED X4 WITH DRESSING TO RT. HIP INTACT. LUNGS CTA WITH LIMITED
ROM TO RT. HIP BUT UP WITH ASSIST WITH WALKER WITH THERAPY AND AMBULATED 15
FT. IN HALLWAY. ABDOMEN OBESE AND SOFT WITH BOWEL SOUNDS NOTED. INSTRUCTED ON
NEED FOR STOOL SPECIMEN. DENIES ANY PAIN OR DISCOMFORT AT THIS TIME. IV TO
LEFT HAND INFUSING AT PRESCRIBED RATE.

## 2020-01-05 VITALS — SYSTOLIC BLOOD PRESSURE: 119 MMHG | DIASTOLIC BLOOD PRESSURE: 65 MMHG

## 2020-01-05 VITALS — SYSTOLIC BLOOD PRESSURE: 139 MMHG | DIASTOLIC BLOOD PRESSURE: 58 MMHG

## 2020-01-05 LAB
ANION GAP SERPL CALC-SCNC: 10.7 MMOL/L (ref 8–16)
BASOPHILS NFR BLD AUTO: 0.3 % (ref 0–2)
BUN SERPL-MCNC: 20 MG/DL (ref 7–18)
CALCIUM SERPL-MCNC: 8.7 MG/DL (ref 8.5–10.1)
CHLORIDE SERPL-SCNC: 109 MMOL/L (ref 98–107)
CO2 SERPL-SCNC: 27.3 MMOL/L (ref 21–32)
CREAT SERPL-MCNC: 1.1 MG/DL (ref 0.6–1.3)
EOSINOPHIL NFR BLD: 3.8 % (ref 0–7)
ERYTHROCYTE [DISTWIDTH] IN BLOOD BY AUTOMATED COUNT: 13.8 % (ref 11.5–14.5)
GLUCOSE SERPL-MCNC: 111 MG/DL (ref 74–106)
HCT VFR BLD CALC: 30.8 % (ref 42–54)
HGB BLD-MCNC: 10.1 G/DL (ref 13.5–17.5)
IMM GRANULOCYTES NFR BLD: 0.7 % (ref 0–5)
LYMPHOCYTES NFR BLD AUTO: 25.7 % (ref 15–50)
MCH RBC QN AUTO: 33.1 PG (ref 26–34)
MCHC RBC AUTO-ENTMCNC: 32.8 G/DL (ref 31–37)
MCV RBC: 101 FL (ref 80–100)
MONOCYTES NFR BLD: 12.8 % (ref 2–11)
NEUTROPHILS NFR BLD AUTO: 56.7 % (ref 40–80)
OSMOLALITY SERPL CALC.SUM OF ELEC: 288 MOSM/KG (ref 275–300)
PLATELET # BLD: 235 10X3/UL (ref 130–400)
PMV BLD AUTO: 10.3 FL (ref 7.4–10.4)
POTASSIUM SERPL-SCNC: 4 MMOL/L (ref 3.5–5.1)
RBC # BLD AUTO: 3.05 10X6/UL (ref 4.2–6.1)
SODIUM SERPL-SCNC: 143 MMOL/L (ref 136–145)
WBC # BLD AUTO: 8.9 10X3/UL (ref 4.8–10.8)

## 2020-01-05 NOTE — NUR
BEDSIDE REPORT COMPLETE. PT LYING IN BED WATCHING TV. DENIES ANY NEEDS OR
PAIN. NO SIGNS OF ACUTE DISTRESS NOTED. VS STABLE. SHIFT ASSESSMENT COMPLETE.
CL IN REACH. FALL PRECAUTIONS IN PLACE. WILL CONTINUE TO MONITOR

## 2020-01-05 NOTE — NUR
SITTING UP IN BED WATCHING TV. USING URINAL AS NEEDED. MOD TO MAX ASST TO
TRANSFER TO . WAITING ON OT FOR FIRST SHOWER. CALL LIGHT IN REACH

## 2020-01-05 NOTE — NUR
SITTING UP IN BED WATCHING TV. DENIES INCREASED PAIN TO RT HIP. DR TRIMBLE
VISITED THIS AM. CALL LIGHT IN REACH

## 2020-01-05 NOTE — NUR
PT LYING IN BED AWAKE WATCHING TV. DENIES ANY NEEDS OR PAIN. NO SIGNS OF ACUTE
DISTRESS NOTED. CL IN REACH

## 2020-01-05 NOTE — NUR
QUIET HOURS. PT LYING IN BED EYES CLOSED RESTING. RR EVEN AND UNLABORED.
CONTINUES ON 1.5L NC HS. CL IN REACH

## 2020-01-06 VITALS — SYSTOLIC BLOOD PRESSURE: 127 MMHG | DIASTOLIC BLOOD PRESSURE: 59 MMHG

## 2020-01-06 VITALS — SYSTOLIC BLOOD PRESSURE: 152 MMHG | DIASTOLIC BLOOD PRESSURE: 61 MMHG

## 2020-01-06 LAB
ANION GAP SERPL CALC-SCNC: 14 MMOL/L (ref 8–16)
BASOPHILS NFR BLD AUTO: 0.4 % (ref 0–2)
BUN SERPL-MCNC: 19 MG/DL (ref 7–18)
CALCIUM SERPL-MCNC: 8.8 MG/DL (ref 8.5–10.1)
CHLORIDE SERPL-SCNC: 109 MMOL/L (ref 98–107)
CO2 SERPL-SCNC: 27.1 MMOL/L (ref 21–32)
CREAT SERPL-MCNC: 1 MG/DL (ref 0.6–1.3)
EOSINOPHIL NFR BLD: 5.1 % (ref 0–7)
ERYTHROCYTE [DISTWIDTH] IN BLOOD BY AUTOMATED COUNT: 13.8 % (ref 11.5–14.5)
GLUCOSE SERPL-MCNC: 100 MG/DL (ref 74–106)
HCT VFR BLD CALC: 32.1 % (ref 42–54)
HGB BLD-MCNC: 10.5 G/DL (ref 13.5–17.5)
IMM GRANULOCYTES NFR BLD: 0.8 % (ref 0–5)
LYMPHOCYTES NFR BLD AUTO: 23.2 % (ref 15–50)
MCH RBC QN AUTO: 33.1 PG (ref 26–34)
MCHC RBC AUTO-ENTMCNC: 32.7 G/DL (ref 31–37)
MCV RBC: 101.3 FL (ref 80–100)
MONOCYTES NFR BLD: 10.5 % (ref 2–11)
NEUTROPHILS NFR BLD AUTO: 60 % (ref 40–80)
OSMOLALITY SERPL CALC.SUM OF ELEC: 292 MOSM/KG (ref 275–300)
PLATELET # BLD: 280 10X3/UL (ref 130–400)
PMV BLD AUTO: 10.2 FL (ref 7.4–10.4)
POTASSIUM SERPL-SCNC: 4.1 MMOL/L (ref 3.5–5.1)
RBC # BLD AUTO: 3.17 10X6/UL (ref 4.2–6.1)
SODIUM SERPL-SCNC: 146 MMOL/L (ref 136–145)
WBC # BLD AUTO: 8.9 10X3/UL (ref 4.8–10.8)

## 2020-01-06 NOTE — NUR
BEDSIDE REPORT COMPLETE. PT SITTING UP IN BED VISITING WITH WIFE. DENIES ANY
NEEDS OR PAIN. NO SIGNS OF ACUTE DISTRESS NOTED. VS STABLE. SHIFT ASSESSMENT
COMPLETE. CL IN REACH. FALL PRECAUTIONS IN PLACE. WILL CONTINUE TO MONITOR

## 2020-01-06 NOTE — NUR
SITTING UP IN BED FOR LUNCH. INCISION TO RT HIP INTACT. NO FURTHER DRAINAGE
NOTED FROM RT HIP REPTURED BLISTER SITE. BAND AID COVERING AREA. ABLE TO PULL
SELF UP IN BED WITH MIN ASST. DENIES INCREASED PAIN. USES URINAL. CALL LIGHT
IN REACH

## 2020-01-06 NOTE — NUR
PATIENT ADMITTED TO REHAB FROM ACUTE FLOOR. DR. MENDIETA IS PATIENT PCP. HE HAS
O2 ( [facility] ) THAT HE USES AT . DISCHARGE PLANS ARE FOR HIM TO RETURN
HOME WITH HIS SPOUSE. WILL CONTINUE TO FOLLOW WITH PATIENT.

## 2020-01-07 VITALS — SYSTOLIC BLOOD PRESSURE: 124 MMHG | DIASTOLIC BLOOD PRESSURE: 53 MMHG

## 2020-01-07 NOTE — NUR
QUIET HOURS. PT LYING IN BED EYES CLOSED RESTING. NO SIGNS OF ACUTE DISTRESS
NOTED. CONTINUES ON 1.5L VIA NC. CL IN REACH

## 2020-01-07 NOTE — NUR
PATIENT RECEIVED SITTING UP IN WHEELCHAIR. PATIENT WIFE IN ROOM. VITALS SIGNS
& ASSESSMENT DONE. NO C/O PAIN OR DISTRESS. CALL LIGHT WITHIN REACH. WILL
CONTINUE TO MONITOR.

## 2020-01-07 NOTE — NUR
PT SITTING UP IN BED WATCHING MORNING NEWS. DENIES ANY NEEDS OR PAIN. NO SIGNS
OF ACUTE DISTRESS NOTED. CL IN REACH

## 2020-01-08 VITALS — DIASTOLIC BLOOD PRESSURE: 70 MMHG | SYSTOLIC BLOOD PRESSURE: 145 MMHG

## 2020-01-08 VITALS — SYSTOLIC BLOOD PRESSURE: 122 MMHG | DIASTOLIC BLOOD PRESSURE: 52 MMHG

## 2020-01-08 LAB
ANION GAP SERPL CALC-SCNC: 11.6 MMOL/L (ref 8–16)
BASOPHILS NFR BLD AUTO: 0.6 % (ref 0–2)
BUN SERPL-MCNC: 20 MG/DL (ref 7–18)
CALCIUM SERPL-MCNC: 8.3 MG/DL (ref 8.5–10.1)
CHLORIDE SERPL-SCNC: 109 MMOL/L (ref 98–107)
CO2 SERPL-SCNC: 27.2 MMOL/L (ref 21–32)
CREAT SERPL-MCNC: 1 MG/DL (ref 0.6–1.3)
EOSINOPHIL NFR BLD: 4.6 % (ref 0–7)
ERYTHROCYTE [DISTWIDTH] IN BLOOD BY AUTOMATED COUNT: 13.7 % (ref 11.5–14.5)
GLUCOSE SERPL-MCNC: 94 MG/DL (ref 74–106)
HCT VFR BLD CALC: 30.8 % (ref 42–54)
HGB BLD-MCNC: 9.9 G/DL (ref 13.5–17.5)
IMM GRANULOCYTES NFR BLD: 0.8 % (ref 0–5)
LYMPHOCYTES NFR BLD AUTO: 24.9 % (ref 15–50)
MCH RBC QN AUTO: 32.7 PG (ref 26–34)
MCHC RBC AUTO-ENTMCNC: 32.1 G/DL (ref 31–37)
MCV RBC: 101.7 FL (ref 80–100)
MONOCYTES NFR BLD: 11.2 % (ref 2–11)
NEUTROPHILS NFR BLD AUTO: 57.9 % (ref 40–80)
OSMOLALITY SERPL CALC.SUM OF ELEC: 289 MOSM/KG (ref 275–300)
PLATELET # BLD: 296 10X3/UL (ref 130–400)
PMV BLD AUTO: 10 FL (ref 7.4–10.4)
POTASSIUM SERPL-SCNC: 3.8 MMOL/L (ref 3.5–5.1)
RBC # BLD AUTO: 3.03 10X6/UL (ref 4.2–6.1)
SODIUM SERPL-SCNC: 144 MMOL/L (ref 136–145)
WBC # BLD AUTO: 9 10X3/UL (ref 4.8–10.8)

## 2020-01-08 NOTE — NUR
PATIENT C/O PAIN LEVEL & TO RIGHT HIP & GROIN. PAIN MEDICATION GIVEN. BED LOW.
CALL LIGHT WITHIN REACH. WILL CONTINUE TO MONITOR.

## 2020-01-08 NOTE — NUR
PT SITTING UP IN WHEELCHAIR IN ROOM. DENIES NEEDS AT THIS TIME. BED IN LOW
SIDE RAILS X2. CL IN REACH. RESP EVEN AND UNLABORED. O2 ON 1.5L VIA NC. A/O
X4. LUNGS CLEAR. BOWEL ACTIVE X4. WILL CONTINUE TO MONITOR.

## 2020-01-09 VITALS — DIASTOLIC BLOOD PRESSURE: 61 MMHG | SYSTOLIC BLOOD PRESSURE: 119 MMHG

## 2020-01-09 VITALS — SYSTOLIC BLOOD PRESSURE: 134 MMHG | DIASTOLIC BLOOD PRESSURE: 59 MMHG

## 2020-01-09 NOTE — NUR
NUTRITION F/U
PT TOLERATING REG DIET WITH 100% INTAKE RECENT MEALS. PT HOPING FOR DC SOON.
WILL CONTINUE TO PROVIDE DIET, HONOR FOOD PREFERENCES.
RD FOLLOWING

## 2020-01-09 NOTE — NUR
PATIENT RECEIVED SITTING UP IN WHEELCHAIR. WIFE IN ROOM. ASSESSMENT & VITAL
SIGNS DONE. NO C/O PAIN OR DISTRESS. CALL LIGHT WITHIN REACH. WILL CONTINUE TO
MONITOR.

## 2020-01-09 NOTE — NUR
SITTING UP IN WC IN ROOM WATCHING TV. HAS BEEN ROLLING AROUND THERAPY GYM
TODAY. NO S/S INFECTION TO RT HIP INCISION. DENIES NEEDS OR C/O. CALL LIGHT IN
REACH

## 2020-01-10 VITALS — SYSTOLIC BLOOD PRESSURE: 122 MMHG | DIASTOLIC BLOOD PRESSURE: 55 MMHG

## 2020-01-10 VITALS — SYSTOLIC BLOOD PRESSURE: 132 MMHG | DIASTOLIC BLOOD PRESSURE: 58 MMHG

## 2020-01-10 LAB
ANION GAP SERPL CALC-SCNC: 12.4 MMOL/L (ref 8–16)
BASOPHILS NFR BLD AUTO: 0.5 % (ref 0–2)
BUN SERPL-MCNC: 20 MG/DL (ref 7–18)
CALCIUM SERPL-MCNC: 9 MG/DL (ref 8.5–10.1)
CHLORIDE SERPL-SCNC: 108 MMOL/L (ref 98–107)
CO2 SERPL-SCNC: 29.2 MMOL/L (ref 21–32)
CREAT SERPL-MCNC: 1.1 MG/DL (ref 0.6–1.3)
EOSINOPHIL NFR BLD: 3.7 % (ref 0–7)
ERYTHROCYTE [DISTWIDTH] IN BLOOD BY AUTOMATED COUNT: 13.6 % (ref 11.5–14.5)
GLUCOSE SERPL-MCNC: 95 MG/DL (ref 74–106)
HCT VFR BLD CALC: 34.8 % (ref 42–54)
HGB BLD-MCNC: 11.4 G/DL (ref 13.5–17.5)
IMM GRANULOCYTES NFR BLD: 0.7 % (ref 0–5)
LYMPHOCYTES NFR BLD AUTO: 25.5 % (ref 15–50)
MCH RBC QN AUTO: 32.9 PG (ref 26–34)
MCHC RBC AUTO-ENTMCNC: 32.8 G/DL (ref 31–37)
MCV RBC: 100.6 FL (ref 80–100)
MONOCYTES NFR BLD: 8.3 % (ref 2–11)
NEUTROPHILS NFR BLD AUTO: 61.3 % (ref 40–80)
OSMOLALITY SERPL CALC.SUM OF ELEC: 293 MOSM/KG (ref 275–300)
PLATELET # BLD: 383 10X3/UL (ref 130–400)
PMV BLD AUTO: 9.9 FL (ref 7.4–10.4)
POTASSIUM SERPL-SCNC: 3.6 MMOL/L (ref 3.5–5.1)
RBC # BLD AUTO: 3.46 10X6/UL (ref 4.2–6.1)
SODIUM SERPL-SCNC: 146 MMOL/L (ref 136–145)
WBC # BLD AUTO: 8.2 10X3/UL (ref 4.8–10.8)

## 2020-01-10 NOTE — NUR
SITTING IN WC IN ROOM WATCHING TV. DENIES NEEDS OR C/O. DOES MORE FOR HIMSELF
NOW, HE STATES HE FEELS LIKE HE IS GETTING STRONGER. RT HIP INCISION INTACT.
NO S/S INFECTION. HAS BEEN IN THERAPY THIS AM AND DENIES NEW C/O.

## 2020-01-11 VITALS — SYSTOLIC BLOOD PRESSURE: 141 MMHG | DIASTOLIC BLOOD PRESSURE: 61 MMHG

## 2020-01-11 VITALS — SYSTOLIC BLOOD PRESSURE: 155 MMHG | DIASTOLIC BLOOD PRESSURE: 65 MMHG

## 2020-01-11 NOTE — NUR
PT RESTING IN BED WITH EYES CLOSED. AWOKE EASILY TO VERBAL STIMULI. TOLERATED
AM MED WITHOUT DIFFICULTY. ANXIOUS TO GET PT STARTED.

## 2020-01-11 NOTE — NUR
PT RESTING IN BED WATCHIN TV. ALERT AND ORIENTED X 3. DENIES ACUTE DISCOMFORT
AT THIS TIME. NO NEEDS VOICED. INCISION TO RIGHT HIP IS HEALING WELL. NO
DRAINAGE NOTED. VSS. PT IS [place]. USING URINAL PRN. SR'S ARE UP X 2 IN BED. CALL
LIGHT AND BEDSIDE TABLE ARE WITHIN EASY REACH.

## 2020-01-12 VITALS — SYSTOLIC BLOOD PRESSURE: 143 MMHG | DIASTOLIC BLOOD PRESSURE: 64 MMHG

## 2020-01-12 NOTE — NUR
PT IS RESTING IN BED WITH EYES OPEN. ALERT AND ORIENTED X 3. DENIES ACUTE
PAIN OR DISCOMFORT AT THIS TIME. NO NEEDS VOICED. INCISION TO RIGHT HIP IS
HEALING WELL. NO DRAINAGE NOTED. O2 IS ON @ 1.5 LPM PER NC. NO SOB NOTED. VSS.
SR'S ARE UP X 2 IN BED. CALL LIGHT AND BEDSIDE TABLE ARE WITHIN EASY REACH.

## 2020-01-13 LAB
ANION GAP SERPL CALC-SCNC: 9.3 MMOL/L (ref 8–16)
BASOPHILS NFR BLD AUTO: 0.4 % (ref 0–2)
BUN SERPL-MCNC: 16 MG/DL (ref 7–18)
CALCIUM SERPL-MCNC: 8.6 MG/DL (ref 8.5–10.1)
CHLORIDE SERPL-SCNC: 109 MMOL/L (ref 98–107)
CO2 SERPL-SCNC: 31.1 MMOL/L (ref 21–32)
CREAT SERPL-MCNC: 1 MG/DL (ref 0.6–1.3)
EOSINOPHIL NFR BLD: 3.7 % (ref 0–7)
ERYTHROCYTE [DISTWIDTH] IN BLOOD BY AUTOMATED COUNT: 13.5 % (ref 11.5–14.5)
GLUCOSE SERPL-MCNC: 86 MG/DL (ref 74–106)
HCT VFR BLD CALC: 32.2 % (ref 42–54)
HGB BLD-MCNC: 10.3 G/DL (ref 13.5–17.5)
IMM GRANULOCYTES NFR BLD: 0.7 % (ref 0–5)
LYMPHOCYTES NFR BLD AUTO: 27.7 % (ref 15–50)
MCH RBC QN AUTO: 32.4 PG (ref 26–34)
MCHC RBC AUTO-ENTMCNC: 32 G/DL (ref 31–37)
MCV RBC: 101.3 FL (ref 80–100)
MONOCYTES NFR BLD: 8.6 % (ref 2–11)
NEUTROPHILS NFR BLD AUTO: 58.9 % (ref 40–80)
OSMOLALITY SERPL CALC.SUM OF ELEC: 288 MOSM/KG (ref 275–300)
PLATELET # BLD: 391 10X3/UL (ref 130–400)
PMV BLD AUTO: 10 FL (ref 7.4–10.4)
POTASSIUM SERPL-SCNC: 4.4 MMOL/L (ref 3.5–5.1)
RBC # BLD AUTO: 3.18 10X6/UL (ref 4.2–6.1)
SODIUM SERPL-SCNC: 145 MMOL/L (ref 136–145)
WBC # BLD AUTO: 8.4 10X3/UL (ref 4.8–10.8)

## 2020-01-13 NOTE — RHP
PATIENT: LACEY LANDON                         MEDICAL RECORD: [id number]
ACCOUNT: [id number]                                    [facility]   [id number]
: 44                                            ADMISSION DATE: 20
                                                         
 
                     REHABILITATION HISTORY AND PHYSICAL EXAMINATION
                         POST ADMISSION PHYSICIAN EXAMINATION
 
 
DATE OF ADMISSION:  2020.
 
ADMITTING DIAGNOSIS:  Right total hip arthroplasty.
 
HISTORY OF PRESENT ILLNESS:  The patient is a 79-year-old gentleman who had been
having some problems with his right hip for quite some time.  He has been
getting some injections and other nonoperative therapy.  He is tired of it
affecting his activities of daily living and wants that done.  He has been
followed Dr. Woodard for history of hypertension, obstructive sleep apnea, had
elective surgery done on 2019.  His white count was up a little bit and
his H and H remained stable.  He did have questionable maybe right lower lobe
pneumonia on x-ray after this and started on Mucinex and Levaquin.  He has been
having a difficult time and ambulate with therapy does not feel like he is ready
to go home and is considering rehab to come in; previously lived with his wife,
was independent with ADLs and mobility.  Currently, he is mod-to-max assist for
sit to stand and bed to chair.  He is max assist with balance and safety while
walking.  He continues to have cueing for dragging his right foot and proper
walker placement.  He will need to correct all this prior to his going home.
 
COMORBIDITIES:  Include respiratory distress, cardiac arrhythmia, fluid
overload, DVT, falls, loss of appetite, change in cognition, electrolyte
imbalance, anemia, malnutrition, dehydration, weight loss, hypoxia, infection,
anxiety, depression, hyperglycemia, hypertension, and hypertensive urgency.
 
PAST MEDICAL HISTORY:  He has had problems with allergies and sinus problems,
got a history of sleep apnea and wears O2, pneumonia.  He has a history of
musculoskeletal complaints.
 
PAST SURGICAL HISTORY:  Includes gallbladder surgery, tonsillectomy and
adenoidectomy.  He has had a right elbow surgery times 2, bilateral knee
replacement.
 
ALLERGIES:  No known drug allergies.
 
CURRENT MEDICATIONS:  Include Floranex 460 mg daily, Flomax 0.4 mg daily,
multivitamin daily, lisinopril 20 mg daily, Flonase nasal spray daily, omega 3
one cap daily, vitamin C 500 mg daily, amlodipine 10 mg daily, Protonix 40 mg
daily, Colace 100 mg b.i.d., Senna 2 tabs at bedtime, metoprolol 25 mg b.i.d.,
doxazosin 2 mg at bedtime, aspirin chewable 81 mg b.i.d., Dulcolax 10 mg b.i.d.
p.r.n. constipation, MiraLax 17 grams b.i.d. p.r.n., and Levaquin.  He is going
to get 10 total dosages of this and Norco 10/325 one tab q.6 hours p.r.n. pain.
 
HABITS:  No alcohol or tobacco use.
 
FAMILY HISTORY:  Noncontributory.
 
SOCIAL HISTORY:  The patient hopes to return back home and get back to his prior
level of functioning.
 
 
 
 
HISTORY AND PHYSICAL                           [id number]    LACEY LANDON 
 
 
REVIEW OF SYSTEMS:
GENERAL:  Does complain of weakness and fatigue.
HEENT:  Denies cold, cough, or congestion.
CARDIOVASCULAR:  Denies chest pain.
 
PHYSICAL EXAMINATION:
VITAL SIGNS:  Stable, afebrile.
GENERAL:  A well-developed gentleman in no acute distress, alert upon exam.
HEENT:  Normocephalic and atraumatic.  Mucosa moist.
NECK:  Supple.  No lymphadenopathy.
LUNGS:  Clear in upper fields with decreased breath sounds in the bases
secondary to body habitus.
CARDIOVASCULAR:  Regular rate and rhythm.
ABDOMEN:  Soft and nondistended.  Positive bowel sounds times 4.
EXTREMITIES:  No clubbing, cyanosis, or edema.  His postop area looks good.
NEUROLOGIC:  Mainly intact.
 
LABORATORY DATA:  Admit labs show a white count of 9.7, H and H of 10 and 31 and
platelet count is noted to be 213.  His sodium is 142, potassium 3.9, BUN and
creatinine of 20 and 1.0, and blood sugar was noted to be 102.  Admit UA was
essentially negative.
 
ASSESSMENT:  This is a 79-year-old gentleman admitted to the rehab with a
working diagnosis of status post total hip replacement.  The patient was also
noted to be obese.  Patient has potential to make improvement.  We instituted
the following multidisciplinary therapies include, but not limited to physical,
occupational, respiratory, speech, nutritional services, prosthetics and
orthotics.  Given his complex medical condition and risk for more complications,
rehabilitation services cannot be provided at a low level of care such as
skilled nursing facility.
 
PLAN:
1.  Admit to [facility] rehab for an intensive inpatient
therapy to include the following disciplines:
A.  Physical therapy to improve gait, all transfer skills and bed mobility to a
modified independent level.
B.  Occupational therapy to improve activities of daily living.
C.  Case management to assist with discharge planning and placement options.
D.  Nutrition to assist with nutritional needs.
E.  Rehabilitation nursing to assist in monitoring the patient's underlying
medical conditions and to assist with any type of bowel or bladder management.
2.  The patient's current medication and medical care will be continued.
3.  The patient will be placed on standard fall precautions.
4.  The patient's estimated length of stay is approximately 7-10 days.
5.  We will discuss this during care team staff meeting this week, which should
be in the a.m.
 
TRANSINT:[id number] Voice Confirmation ID: [id number] DOCUMENT ID: [id number]
 
 
NATHAN notes whether there has been none or any medical/functional
change since admission:
- No change since preadmission screen.                                  
 
 
 
 
 
HISTORY AND PHYSICAL                           [id number]    LACEY LANDON attests patient continues to be appropriate for IRF:
- Continues to be appropriate.                                          
 
 
                                           
                                           IAM HONG MD           
 
 
 
Electronically Signed by IAM HONG on 20 at 0834
 
 
 
 
 
 
 
 
 
 
 
 
 
 
 
 
 
 
 
 
 
 
 
 
 
 
 
 
 
 
 
 
 
 
 
 
 
CC:                                                             [id number]
DICTATION DATE: 20     :     20 191      ADM IN  
                                                                              
[facility]                                          
[address] [place] [address]

## 2020-01-13 NOTE — NUR
PT IS RESTING IN BED WITH EYES OPEN. ALERT AND ORIENTED X 3. DENIES ACUTE
PAIN OR DISCOMFORT AT THIS TIME. NO NEEDS VOICED. O2 IS ON @ 1.5 LPM  PER NC.
VSS. INCISION TO RIGHT HIP IS HEALING WELL. NO DRAINAGE NOTED. SR'S ARE UP X 2
IN BED. CALL LIGHT AND BEDSIDE TABLE ARE WITHIN EASY REACH.

## 2020-01-14 VITALS — DIASTOLIC BLOOD PRESSURE: 60 MMHG | SYSTOLIC BLOOD PRESSURE: 159 MMHG

## 2020-01-14 NOTE — NUR
SITTING IN WC IN ROOM WAITING ON WIFE TO COME GET HIM TO TAKE HIM HOME.
REVIEWED MEDS AND DC PLAN WITH PT. HE DECLINED TO HAVE ANY MEDS CALLED IN TO
HIS PHARMACY. CALL LIGHT IN REACH

## 2020-01-14 NOTE — NUR
OPATIENT DISCHARGING HOME TODAY WITH FAMILY. [facility] WILL PROVIDE
THERAPY AT HOME. PATIENT CHOICE FORM FOR HOME HEALTH WITH COMPARE DATA
REVIEWED WITH PATIENT AND SPOUSE THEY BOTH VOICED UNDERSTANDING. [facility] FORM
SIGNED COPY PLACED IN CHART AND ONE GIVEN TO PATIENT. DR. MENDIETA 1/24/2020 @
10:45, DR. TRIMBLE 1/17/2020 @ 10:00. DISCHARGE INSTRUCTIONS FAXED TO PCP,
HOME HEALTH AND REVIEWED WITH PATIENT.

## 2020-03-17 ENCOUNTER — HOSPITAL ENCOUNTER (OUTPATIENT)
Dept: HOSPITAL 84 - D.OPS | Age: 80
Discharge: HOME | End: 2020-03-17
Attending: UROLOGY
Payer: MEDICARE

## 2020-03-17 VITALS — SYSTOLIC BLOOD PRESSURE: 123 MMHG | DIASTOLIC BLOOD PRESSURE: 72 MMHG

## 2020-03-17 VITALS — WEIGHT: 251 LBS | BODY MASS INDEX: 35.93 KG/M2 | HEIGHT: 70 IN

## 2020-03-17 DIAGNOSIS — I10: ICD-10-CM

## 2020-03-17 DIAGNOSIS — C61: Primary | ICD-10-CM

## 2020-03-17 DIAGNOSIS — N32.0: ICD-10-CM

## 2020-03-17 LAB
APTT BLD: 29.7 SECONDS (ref 22.8–39.4)
BASOPHILS NFR BLD AUTO: 0.9 % (ref 0–2)
EOSINOPHIL NFR BLD: 4.9 % (ref 0–7)
ERYTHROCYTE [DISTWIDTH] IN BLOOD BY AUTOMATED COUNT: 14.1 % (ref 11.5–14.5)
HCT VFR BLD CALC: 42.2 % (ref 42–54)
HGB BLD-MCNC: 13.8 G/DL (ref 13.5–17.5)
IMM GRANULOCYTES NFR BLD: 0.3 % (ref 0–5)
INR PPP: 0.98 (ref 0.85–1.17)
LYMPHOCYTES NFR BLD AUTO: 30.6 % (ref 15–50)
MCH RBC QN AUTO: 33.6 PG (ref 26–34)
MCHC RBC AUTO-ENTMCNC: 32.7 G/DL (ref 31–37)
MCV RBC: 102.7 FL (ref 80–100)
MONOCYTES NFR BLD: 9.6 % (ref 2–11)
NEUTROPHILS NFR BLD AUTO: 53.7 % (ref 40–80)
PLATELET # BLD: 207 10X3/UL (ref 130–400)
PMV BLD AUTO: 9.7 FL (ref 7.4–10.4)
PROTHROMBIN TIME: 13 SECONDS (ref 11.6–15)
RBC # BLD AUTO: 4.11 10X6/UL (ref 4.2–6.1)
WBC # BLD AUTO: 5.8 10X3/UL (ref 4.8–10.8)

## 2020-03-17 NOTE — OP
PATIENT NAME:  LACEY LANDON                   MEDICAL RECORD: [id number]
:44                                             LOCATION:D.OPS          
                                                         ADMISSION DATE:        
SURGEON:  RENE FLEMING MD              
 
 
DATE OF OPERATION:  2020
 
SURGEON:  Rene Fleming MD
 
ANESTHESIA:  TIVA by Filemon Romo MD
 
DIAGNOSES:  Prostate cancer, bladder outlet obstruction.
 
PROCEDURE:  UroLift times 6.
 
FINDINGS:  Obstructive bilateral lateral lobes, which are long.  No median lobe.
 Heavily trabeculated bladder with diverticula and cellules, single ureteral
orifices, no bladder tumors.
 
ESTIMATED BLOOD LOSS:  Minimal.
 
CLINICAL HISTORY:  This is a 79-year-old male with a stage T1c prostate cancer,
which is under observation.  His latest PSA is 2.17 (2020).  On
transrectal ultrasound, his prostate is 46 grams in size.  He has trouble with
voiding.  He has nocturia times 5 and daytime urinary frequency every 1 hour. 
He has postvoid dribbling.  IPSS score is 25.  Quality of life score is 4.  He
wishes to have the UroLift procedure done to relieve his bladder outlet
obstruction.  He has no medication allergies.  He was given Levaquin IV on call
to the OR.
 
DESCRIPTION OF PROCEDURE:  The patient was given IV sedation.  He was then
placed into lithotomy position and prepped and draped.  The UroLift scope was
introduced.  A 1.5 cm distal to the bladder neck at the anterolateral sulcus,
one unit was placed on each side.  Then, at the level of the verumontanum, at
the anterolateral sulcus, one unit was placed on each side.  Looking in with the
obturator, there was still obstruction of the prostate in the mid prostatic
level.  In the middle of the prostatic urethra, at the middle distance in the
anterior to posterior plane, we placed one unit on each side.  This now resulted
in a wide open prostatic urethra from the apex to the bladder neck.  The bladder
was left partly full and the scope was removed.  This was for a voiding trial. 
I will see him in followup in 1 months' time.
 
TRANSINT:[id number] Voice Confirmation ID: [id number] DOCUMENT ID: [id number]
                                           
                                           RENE FLEMING MD              
 
 
 
Electronically Signed by RENE FLEMING on 20 at 1552
CC:                                                             [id number]
DICTATION DATE: 20 1111     :     20 1239      [facility] 
                                                                      20
[facility]                                          
[address] [id number]

## 2020-08-21 ENCOUNTER — HOSPITAL ENCOUNTER (INPATIENT)
Dept: HOSPITAL 84 - D.SDCHOLD | Age: 80
LOS: 28 days | Discharge: HOME | DRG: 468 | End: 2020-09-18
Attending: ORTHOPAEDIC SURGERY | Admitting: ORTHOPAEDIC SURGERY
Payer: MEDICARE

## 2020-08-21 ENCOUNTER — HOSPITAL ENCOUNTER (OUTPATIENT)
Dept: HOSPITAL 84 - D.LABREF | Age: 80
Discharge: HOME | End: 2020-08-21
Attending: ORTHOPAEDIC SURGERY
Payer: MEDICARE

## 2020-08-21 VITALS
WEIGHT: 239 LBS | HEIGHT: 70 IN | BODY MASS INDEX: 34.22 KG/M2 | BODY MASS INDEX: 34.22 KG/M2 | BODY MASS INDEX: 34.22 KG/M2 | WEIGHT: 239 LBS | HEIGHT: 70 IN | BODY MASS INDEX: 34.22 KG/M2

## 2020-08-21 VITALS — BODY MASS INDEX: 36 KG/M2

## 2020-08-21 DIAGNOSIS — I10: ICD-10-CM

## 2020-08-21 DIAGNOSIS — G47.33: ICD-10-CM

## 2020-08-21 DIAGNOSIS — M25.362: ICD-10-CM

## 2020-08-21 DIAGNOSIS — M17.12: Primary | ICD-10-CM
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
## 2020-08-21 DIAGNOSIS — T84.89XA: Primary | ICD-10-CM

## 2020-09-11 LAB
ANION GAP SERPL CALC-SCNC: 11.3 MMOL/L (ref 8–16)
APTT BLD: 27 SECONDS (ref 22.8–39.4)
BACTERIA #/AREA URNS HPF: (no result) /HPF
BASOPHILS NFR BLD AUTO: 0.5 % (ref 0–2)
BILIRUB SERPL-MCNC: NEGATIVE MG/DL
BUN SERPL-MCNC: 17 MG/DL (ref 7–18)
CALCIUM SERPL-MCNC: 9.1 MG/DL (ref 8.5–10.1)
CHLORIDE SERPL-SCNC: 107 MMOL/L (ref 98–107)
CO2 SERPL-SCNC: 29.6 MMOL/L (ref 21–32)
CREAT SERPL-MCNC: 1.1 MG/DL (ref 0.6–1.3)
EOSINOPHIL NFR BLD: 2.7 % (ref 0–7)
ERYTHROCYTE [DISTWIDTH] IN BLOOD BY AUTOMATED COUNT: 13.9 % (ref 11.5–14.5)
GLUCOSE SERPL-MCNC: 108 MG/DL (ref 74–106)
HCT VFR BLD CALC: 45.6 % (ref 42–54)
HGB BLD-MCNC: 15.1 G/DL (ref 13.5–17.5)
IMM GRANULOCYTES NFR BLD: 0.5 % (ref 0–5)
INR PPP: 0.99 (ref 0.85–1.17)
KETONES UR STRIP-MCNC: NEGATIVE MG/DL
LYMPHOCYTES NFR BLD AUTO: 32.1 % (ref 15–50)
MCH RBC QN AUTO: 33.9 PG (ref 26–34)
MCHC RBC AUTO-ENTMCNC: 33.1 G/DL (ref 31–37)
MCV RBC: 102.5 FL (ref 80–100)
MONOCYTES NFR BLD: 9.4 % (ref 2–11)
NEUTROPHILS NFR BLD AUTO: 54.8 % (ref 40–80)
NITRITE UR-MCNC: NEGATIVE MG/ML
OSMOLALITY SERPL CALC.SUM OF ELEC: 289 MOSM/KG (ref 275–300)
PH UR STRIP: 5 [PH] (ref 5–8)
PLATELET # BLD: 210 10X3/UL (ref 130–400)
PMV BLD AUTO: 10.4 FL (ref 7.4–10.4)
POTASSIUM SERPL-SCNC: 3.9 MMOL/L (ref 3.5–5.1)
PROTHROMBIN TIME: 13 SECONDS (ref 11.6–15)
RBC # BLD AUTO: 4.45 10X6/UL (ref 4.2–6.1)
SODIUM SERPL-SCNC: 144 MMOL/L (ref 136–145)
SQUAMOUS #/AREA URNS HPF: (no result) /HPF (ref 0–5)
UROBILINOGEN UR-MCNC: NORMAL MG/DL (ref ?–2)
WBC # BLD AUTO: 6.4 10X3/UL (ref 4.8–10.8)
WBC #/AREA URNS HPF: (no result) /HPF (ref 0–1)

## 2020-09-15 VITALS — DIASTOLIC BLOOD PRESSURE: 67 MMHG | SYSTOLIC BLOOD PRESSURE: 149 MMHG

## 2020-09-15 VITALS — SYSTOLIC BLOOD PRESSURE: 143 MMHG | DIASTOLIC BLOOD PRESSURE: 66 MMHG

## 2020-09-15 VITALS — DIASTOLIC BLOOD PRESSURE: 81 MMHG | SYSTOLIC BLOOD PRESSURE: 156 MMHG

## 2020-09-15 VITALS — SYSTOLIC BLOOD PRESSURE: 140 MMHG | DIASTOLIC BLOOD PRESSURE: 56 MMHG

## 2020-09-15 VITALS — DIASTOLIC BLOOD PRESSURE: 69 MMHG | SYSTOLIC BLOOD PRESSURE: 140 MMHG

## 2020-09-15 VITALS — DIASTOLIC BLOOD PRESSURE: 67 MMHG | SYSTOLIC BLOOD PRESSURE: 143 MMHG

## 2020-09-15 VITALS — DIASTOLIC BLOOD PRESSURE: 61 MMHG | SYSTOLIC BLOOD PRESSURE: 141 MMHG

## 2020-09-15 VITALS — SYSTOLIC BLOOD PRESSURE: 145 MMHG | DIASTOLIC BLOOD PRESSURE: 67 MMHG

## 2020-09-15 VITALS — SYSTOLIC BLOOD PRESSURE: 132 MMHG | DIASTOLIC BLOOD PRESSURE: 69 MMHG

## 2020-09-15 VITALS — SYSTOLIC BLOOD PRESSURE: 146 MMHG | DIASTOLIC BLOOD PRESSURE: 67 MMHG

## 2020-09-15 VITALS — SYSTOLIC BLOOD PRESSURE: 147 MMHG | DIASTOLIC BLOOD PRESSURE: 66 MMHG

## 2020-09-15 PROCEDURE — 0SPD0JZ REMOVAL OF SYNTHETIC SUBSTITUTE FROM LEFT KNEE JOINT, OPEN APPROACH: ICD-10-PCS | Performed by: ORTHOPAEDIC SURGERY

## 2020-09-15 PROCEDURE — 0SRD0JZ REPLACEMENT OF LEFT KNEE JOINT WITH SYNTHETIC SUBSTITUTE, OPEN APPROACH: ICD-10-PCS | Performed by: ORTHOPAEDIC SURGERY

## 2020-09-15 NOTE — NUR
THROUGH TRAFFIC KEPT TO A MINIMUM. ALCOHOL AND HIBACLENS USED TO
CLEAN LEG BEFORE PREPPING. STERILE GOWNED AND GLOVED TO PREP WITH
CHLORAPREP.

## 2020-09-15 NOTE — MORECARE
CASE MANAGEMENT DISCHARGE SUMMARY
 
 
PATIENT: ALAN AVINA             UNIT: [id number]
ACCOUNT#: [id number]                       ADM DATE: 09/15/20
AGE: 80     : 42  SEX: M            ROOM/BED: D.1213    
AUTHOR: BE STARKS                             PHYSICIAN:                               
 
REFERRING PHYSICIAN: CECY TRIMBLE DO         
DATE OF SERVICE: 09/15/20
Discharge Plan
 
 
Patient Name: ALAN AVINA
Facility: [facility]
Encounter #: [id number]
Medical Record #: [id number]
: 1940
Planned Disposition: Outpatient PT\OT Anticipated Discharge Date: 20
 
Discharge Date: 
Expected LOS: 2
Initial Reviewer: [email or web address]
Initial Review Date: 09/15/2020
Generated: 9/15/20   6:30 pm 
Comments
 
DCP- Discharge Planning
 
Updated by [email or web address]: Martha Matos on 9/15/20   3:41 pm CT
DC Plan: [facility], #[phone], Fx: [phone].   
CM verified with patient's wife OP Therapy and she is in agreement to same. 
Faxed required information to [place], with [facility].  
  
CM met with patient to discuss initial discharge planning. Patient is in 
agreement to proceed with the assessment. Patient reports that he lives at 
home independently with his wife, Meaghan Avina ([phone]). Patient is 
alert/oriented. Stairs/steps:   PCP: Dr. Herrera. Pharmacy: [facility]. 
    Patient states he has been able to obtain all of his prescribed 
medications. HHS: None. DME: CPM, Ice wrap, walker, shower chair. Patient 
gives permission to speak with family members: spouse. Patient is Independent 
with all ADL's, medication management PTA. CM discussed the availability of 
HH, Rehab, SNF, OP Therapy, DME services. Patient would like to use [facility] ([phone]) and feels safe returning to previous environment. Patient 
denies hospitalization within the past 30 days. Patient denies the use of 
community resources PTA. Transportation at time of discharge: Spouse, Meaghan. 
CM will follow and assist with DC needs/plans PRN.
  
 
 
 
 
 
 
 
Coverage Notice
 
Reviewer: [email or web address] - Martha Matos
 
Notice Issued Date-Time: 09/15/2020  15:57
Notice Type: Patient Choice Letter
 
Notice Delivered To: Patient
Relationship to Patient: Self
Representative Name: Alan Avina
 
Delivery Method: HAND - Hand Delivered
Jeanne Days:
Prior Verbal Notification: 
 
Recipient Understood Notice: Yes
Recipient Signature: Yes
Med Rec Note Co-signed by Attending:
 
Coverage Notice Comment:  [facility] choice signed/given to patient.
 
Last DP export: 9/15/20   4:00 p
Patient Name: ALAN AVINA
Encounter #: [id number]
Page [phone]
 
 
 
 
 
Electronically Signed by BE STARKS on 09/15/20 at 1730
 
 
 
 
 
 
**All edits/amendments must be made on the electronic document**
 
DICTATION DATE: 09/15/20 1730     : JESSICA  09/15/20 1730     
RPT#: [id number]                                DC DATE:        
                                               STATUS: ADM IN  
[facility]
[address] [place] [address]
***END OF REPORT***

## 2020-09-15 NOTE — MORECARE
CASE MANAGEMENT DISCHARGE SUMMARY
 
 
PATIENT: ALAN AVINA             UNIT: [id number]
ACCOUNT#: [id number]                       ADM DATE: 09/15/20
AGE: 80     : 42  SEX: M            ROOM/BED: D.1213    
AUTHOR: BE STARKS                             PHYSICIAN:                               
 
REFERRING PHYSICIAN: CECY TRIMBLE DO         
DATE OF SERVICE: 09/15/20
Discharge Plan
 
 
Patient Name: ALAN AVINA
Facility: [facility]:[place]
Encounter #: [id number]
Medical Record #: [id number]
: 1940
Planned Disposition: 
Anticipated Discharge Date: 
 
Discharge Date: 
Expected LOS: 
Initial Reviewer: [email or web address]
Initial Review Date: 09/15/2020
Generated: 9/15/20   5:02 pm 
Comments
 
DCP- Discharge Planning
 
Updated by [email or web address]: Martha Matos on 9/15/20   2:56 pm CT
DC Plan: [facility], #[phone], Fx: [phone].  
  
CM met with patient to discuss initial discharge planning. Patient is in 
agreement to proceed with the assessment. Patient reports that he lives at 
home independently with his wife, Meaghan Avina ([phone]). Patient is 
alert/oriented. Stairs/steps:   PCP: Dr. Herrera. Pharmacy: [facility]. 
    Patient states he has been able to obtain all of his prescribed 
medications. HHS: None. DME: CPM, Ice wrap, walker, shower chair. Patient 
gives permission to speak with family members: spouse. Patient is Independent 
with all ADL's, medication management PTA. CM discussed the availability of 
HH, Rehab, SNF, OP Therapy, DME services. Patient would like to use [facility] ([phone]) and feels safe returning to previous environment. Patient 
denies hospitalization within the past 30 days. Patient denies the use of 
community resources PTA. Transportation at time of discharge: Spouse, Meaghan. 
CM will follow and assist with DC needs/plans PRN.
  
 
 
 
 
 
 
 
Coverage Notice
 
Reviewer: [email or web address] - Martha Matos
 
Notice Issued Date-Time: 09/15/2020  15:57
Notice Type: Patient Choice Letter
 
Notice Delivered To: Patient
Relationship to Patient: Self
Representative Name: Alan Avina
 
Delivery Method: HAND - Hand Delivered
Jeanne Days:
Prior Verbal Notification: 
 
Recipient Understood Notice: Yes
Recipient Signature: Yes
Med Rec Note Co-signed by Attending:
 
Coverage Notice Comment:  [facility] choice signed/given to patient.
 
Last DP export: 9/15/20   2:34 p
Patient Name: ALAN AVINA
Encounter #: [id number]
Page [phone]
 
 
 
 
 
Electronically Signed by BE STARKS on 09/15/20 at 1602
 
 
 
 
 
 
**All edits/amendments must be made on the electronic document**
 
DICTATION DATE: 09/15/20 160     : JESSICA  09/15/20 1602     
RPT#: [id number]                                DC DATE:        
                                               STATUS: ADM IN  
[facility]
 [place] [address]
***END OF REPORT***

## 2020-09-15 NOTE — MORECARE
CASE MANAGEMENT DISCHARGE SUMMARY
 
 
PATIENT: LACEY LANDON             UNIT: [id number]
ACCOUNT#: [id number]                       ADM DATE: 09/15/20
AGE: 80     : 44  SEX: M            ROOM/BED: D.1213    
AUTHOR: BE STARKS                             PHYSICIAN:                               
 
REFERRING PHYSICIAN: CECY TRIMBLE DO         
DATE OF SERVICE: 09/15/20
Discharge Plan
 
 
Patient Name: LACEY LANDON
[facility]
Encounter #: [id number]
Medical Record #: [id number]
: 1940
Planned Disposition: 
Anticipated Discharge Date: 
 
Discharge Date: 
Expected LOS: 
Initial Reviewer: [email or web address]
Initial Review Date: 09/15/2020
Generated: 9/15/20   4:33 pm 
  
 
 
 
 
 
 
 
 
Last DP export: 9/15/20  10:53 a
Patient Name: LACEY LANDON
 
Encounter #: [id number]
Page [phone]
 
 
 
 
 
Electronically Signed by BE STARKS on 09/15/20 at 1534
 
 
 
 
 
 
**All edits/amendments must be made on the electronic document**
 
DICTATION DATE: 09/15/20 1533     : JESSICA  09/15/20 1533     
RPT#: [id number]                                DC DATE:        
                                               STATUS: ADM IN  
[facility]
 [place] [address]
***END OF REPORT***

## 2020-09-15 NOTE — NUR
PT DRAINAGE IS MORE ON HIS ACE WRAP FROM SURGERY.  DR TRIMBLE SAW IT AND ASKED
THAT IT BE REINFORCED.  THE PT WAS UP TO THE BR AT THIS TIME.  REINFORCEMENT
BANDAGE OF 3 ABD PADS AND ACE WRAP APPLIED. DRAINAGE BLOOD IS NOW BRIGHTER
RED.
PT DID NOT FINISH HIS LAST 30
MINUTES OF HIS CPM.  PT WAS TOLD THAT HE NEEDED TO USE THE URINAL THE REST OF
THE NIGHT SINCE I DID NOT WANT HIM UP POSSIBLY MAKING THE BLEEDING WORSE.  tHE
BLEEDING ACTUALLY INCREASED DURING THE TIME ON THE CPM.

## 2020-09-15 NOTE — NUR
PT AWAKE FOR VS.  THERE IS NO DRAINAGE FROM THE REINFORCED DRAINAGE.  PT HAS
USED HIS URINAL TO VOID 100 ML.  PT STATES HE HAS A PROSTATE PROBLEM.  I ASKED
PT IF HE NEEDED A PAIN PILL AND HE REPLIED NO, I'M NOT TAKING ANY.

## 2020-09-15 NOTE — MORECARE
CASE MANAGEMENT DISCHARGE SUMMARY
 
 
PATIENT: ALAN AVINA             UNIT: [id number]
ACCOUNT#: [id number]                       ADM DATE: 09/15/20
AGE: 80     : 44  SEX: M            ROOM/BED: D.1213    
AUTHOR: BE STARKS                             PHYSICIAN:                               
 
REFERRING PHYSICIAN: CECY TRIMBLE DO         
DATE OF SERVICE: 09/15/20
Discharge Plan
 
 
Patient Name: ALAN AVINA
Facility: [facility]:[place]
Encounter #: [id number]
Medical Record #: [id number]
: 1940
Planned Disposition: 
Anticipated Discharge Date: 
 
Discharge Date: 
Expected LOS: 
Initial Reviewer: [email or web address]
Initial Review Date: 09/15/2020
Generated: 9/15/20   5:50 pm 
Comments
 
DCP- Discharge Planning
 
Updated by [id number]: Martha Matos on 9/15/20   3:41 pm CT
DC Plan: [facility], #[phone], Fx: [phone].   
CM verified with patient's wife OP Therapy and she is in agreement to same. 
Faxed required information to [place], with [facility].  
  
CM met with patient to discuss initial discharge planning. Patient is in 
agreement to proceed with the assessment. Patient reports that he lives at 
home independently with his wife, Meaghan Avina ([phone]). Patient is 
alert/oriented. Stairs/steps:   PCP: Dr. Herrera. Pharmacy: [facility]. 
    Patient states he has been able to obtain all of his prescribed 
medications. HHS: None. DME: CPM, Ice wrap, walker, shower chair. Patient 
gives permission to speak with family members: spouse. Patient is Independent 
with all ADL's, medication management PTA. CM discussed the availability of 
HH, Rehab, SNF, OP Therapy, DME services. Patient would like to use [facility] ([phone]) and feels safe returning to previous environment. Patient 
denies hospitalization within the past 30 days. Patient denies the use of 
community resources PTA. Transportation at time of discharge: Spouse, Meaghan. 
CM will follow and assist with DC needs/plans PRN.
  
 
 
 
 
 
 
 
Coverage Notice
 
Reviewer: [email or web address] - Martha Matos
 
Notice Issued Date-Time: 09/15/2020  15:57
Notice Type: Patient Choice Letter
 
Notice Delivered To: Patient
Relationship to Patient: Self
Representative Name: Alan Avina
 
Delivery Method: HAND - Hand Delivered
Jeanne Days:
Prior Verbal Notification: 
 
Recipient Understood Notice: Yes
Recipient Signature: Yes
Med Rec Note Co-signed by Attending:
 
Coverage Notice Comment:  [facility] choice signed/given to patient.
 
Last DP export: 9/15/20   3:02 p
Patient Name: ALAN AVINA
Encounter #: [id number]
Page [phone]
 
 
 
 
 
Electronically Signed by BE STARKS on 09/15/20 at 1650
 
 
 
 
 
 
**All edits/amendments must be made on the electronic document**
 
DICTATION DATE: 09/15/20 1650     : JESSICA  09/15/20 1650     
RPT#: [id number]                                DC DATE:        
                                               STATUS: ADM IN  
[facility]
[address] [place] [address]
***END OF REPORT***

## 2020-09-15 NOTE — MORECARE
CASE MANAGEMENT DISCHARGE SUMMARY
 
 
PATIENT: ALAN AVINA             UNIT: [id number]
ACCOUNT#: [id number]                       ADM DATE: 09/15/20
AGE: 80     : 44  SEX: M            ROOM/BED: D.1213    
AUTHOR: BE STARKS                             PHYSICIAN:                               
 
REFERRING PHYSICIAN: CECY TRIMBLE DO         
DATE OF SERVICE: 09/15/20
Discharge Plan
 
 
Patient Name: ALAN AVINA
Facility: [facility]:[place]
Encounter #: [id number]
Medical Record #: [id number]
: 1940
Planned Disposition: Outpatient PT\OT Anticipated Discharge Date:
 
Discharge Date: 
Expected LOS: 
Initial Reviewer: [email or web address]
Initial Review Date: 09/15/2020
Generated: 9/15/20   6:00 pm 
Comments
 
DCP- Discharge Planning
 
Updated by [id number]: Martha Matos on 9/15/20   3:41 pm CT
DC Plan: [facility], #, Fx: [phone].   
CM verified with patient's wife OP Therapy and she is in agreement to same. 
Faxed required information to Kenyetta, with [facility].  
  
CM met with patient to discuss initial discharge planning. Patient is in 
agreement to proceed with the assessment. Patient reports that he lives at 
home independently with his wife, Meaghan Avina ([phone]). Patient is 
alert/oriented. Stairs/steps:   PCP: Dr. Herrera. Pharmacy: [facility]. 
    Patient states he has been able to obtain all of his prescribed 
medications. HHS: None. DME: CPM, Ice wrap, walker, shower chair. Patient 
gives permission to speak with family members: spouse. Patient is Independent 
with all ADL's, medication management PTA. CM discussed the availability of 
HH, Rehab, SNF, OP Therapy, DME services. Patient would like to use [facility] 
[facility] ([phone]) and feels safe returning to previous environment. Patient 
denies hospitalization within the past 30 days. Patient denies the use of 
community resources PTA. Transportation at time of discharge: Spouse, Meaghan. 
CM will follow and assist with DC needs/plans PRN.
  
 
 
 
External Providers
External Provider: [facility]
 
Next Contact Date: 
Service Request Date: 
Service Type: 
Resolution: 
 
Reviewer: 
Comments: 
 
 
 
 
Coverage Notice
 
Reviewer: [id number] Leslie Matos
 
Notice Issued Date-Time: 09/15/2020  15:57
Notice Type: Patient Choice Letter
 
Notice Delivered To: Patient
Relationship to Patient: Self
Representative Name: Alan Avina
 
Delivery Method: HAND - Hand Delivered
Jenane Days:
Prior Verbal Notification: 
 
Recipient Understood Notice: Yes
Recipient Signature: Yes
Med Rec Note Co-signed by Attending:
 
Coverage Notice Comment:  [facility] choice signed/given to patient.
 
Last DP export: 9/15/20   3:50 p
Patient Name: ALAN AVINA
 
Encounter #: [id number]
Page [id number]
 
 
 
 
 
Electronically Signed by BE STARKS on 09/15/20 at 1700
 
 
 
 
 
 
**All edits/amendments must be made on the electronic document**
 
DICTATION DATE: 09/15/20 1700     : JESSICA  09/15/20 1700     
RPT#: [id number]                                DC DATE:        
                                               STATUS: ADM IN  
[facility]
[address] [place] [address]
***END OF REPORT***

## 2020-09-15 NOTE — MORECARE
CASE MANAGEMENT DISCHARGE SUMMARY
 
 
PATIENT: LACEY LANDON             UNIT: [id number]
ACCOUNT#: [id number]                       ADM DATE: 09/15/20
AGE: 80     : 44  SEX: M            ROOM/BED: D.1213    
AUTHOR: BE STARKS                             PHYSICIAN:                               
 
REFERRING PHYSICIAN: CECY TRIMBLE DO         
DATE OF SERVICE: 09/15/20
Discharge Plan
 
 
Patient Name: LACEY LANDON
[facility]
Encounter #: [id number]
Medical Record #: [id number]
: 1940
Planned Disposition: 
Anticipated Discharge Date: 
 
Discharge Date: 
Expected LOS: 
Initial Reviewer: [id number]
Initial Review Date: 09/15/2020
Generated: 9/15/20  12:52 pm 
  
 
 
 
 
 
 
 
Patient Name: LACEY LANDON
 
Encounter #: [id number]
Page [id number]
 
 
 
 
 
Electronically Signed by BE STARKS on 09/15/20 at 1152
 
 
 
 
 
 
**All edits/amendments must be made on the electronic document**
 
DICTATION DATE: 09/15/20 1152     : JESSICA  09/15/20 1152     
RPT#: [id number]                                DC DATE:        
                                               STATUS: ADM IN  
[facility]
 [place] [address]
***END OF REPORT***

## 2020-09-16 VITALS — DIASTOLIC BLOOD PRESSURE: 66 MMHG | SYSTOLIC BLOOD PRESSURE: 144 MMHG

## 2020-09-16 VITALS — SYSTOLIC BLOOD PRESSURE: 154 MMHG | DIASTOLIC BLOOD PRESSURE: 65 MMHG

## 2020-09-16 VITALS — SYSTOLIC BLOOD PRESSURE: 144 MMHG | DIASTOLIC BLOOD PRESSURE: 68 MMHG

## 2020-09-16 VITALS — DIASTOLIC BLOOD PRESSURE: 60 MMHG | SYSTOLIC BLOOD PRESSURE: 137 MMHG

## 2020-09-16 LAB
ALBUMIN SERPL-MCNC: 3.2 G/DL (ref 3.4–5)
ALP SERPL-CCNC: 70 U/L (ref 30–120)
ALT SERPL-CCNC: 18 U/L (ref 10–68)
ANION GAP SERPL CALC-SCNC: 12 MMOL/L (ref 8–16)
BASOPHILS NFR BLD AUTO: 0.1 % (ref 0–2)
BILIRUB SERPL-MCNC: 0.37 MG/DL (ref 0.2–1.3)
BUN SERPL-MCNC: 19 MG/DL (ref 7–18)
CALCIUM SERPL-MCNC: 8.3 MG/DL (ref 8.5–10.1)
CHLORIDE SERPL-SCNC: 105 MMOL/L (ref 98–107)
CO2 SERPL-SCNC: 25.1 MMOL/L (ref 21–32)
CREAT SERPL-MCNC: 1.1 MG/DL (ref 0.6–1.3)
EOSINOPHIL NFR BLD: 0 % (ref 0–7)
ERYTHROCYTE [DISTWIDTH] IN BLOOD BY AUTOMATED COUNT: 13.7 % (ref 11.5–14.5)
GLOBULIN SER-MCNC: 2.9 G/L
GLUCOSE SERPL-MCNC: 127 MG/DL (ref 74–106)
HCT VFR BLD CALC: 42.4 % (ref 42–54)
HGB BLD-MCNC: 14.2 G/DL (ref 13.5–17.5)
IMM GRANULOCYTES NFR BLD: 0.2 % (ref 0–5)
LYMPHOCYTES NFR BLD AUTO: 9.9 % (ref 15–50)
MCH RBC QN AUTO: 33.6 PG (ref 26–34)
MCHC RBC AUTO-ENTMCNC: 33.5 G/DL (ref 31–37)
MCV RBC: 100.2 FL (ref 80–100)
MONOCYTES NFR BLD: 8.3 % (ref 2–11)
NEUTROPHILS NFR BLD AUTO: 81.5 % (ref 40–80)
OSMOLALITY SERPL CALC.SUM OF ELEC: 279 MOSM/KG (ref 275–300)
PLATELET # BLD: 214 10X3/UL (ref 130–400)
PMV BLD AUTO: 10.7 FL (ref 7.4–10.4)
POTASSIUM SERPL-SCNC: 4.1 MMOL/L (ref 3.5–5.1)
PROT SERPL-MCNC: 6.1 G/DL (ref 6.4–8.2)
RBC # BLD AUTO: 4.23 10X6/UL (ref 4.2–6.1)
SODIUM SERPL-SCNC: 138 MMOL/L (ref 136–145)
WBC # BLD AUTO: 12.7 10X3/UL (ref 4.8–10.8)

## 2020-09-16 NOTE — NUR
PT PLACED ON CPM MACHINE.  I EXPLAINED THAT I WOULD BE LOOKING FOR MORE
BLEEDING FROM HIS INCISION.  HE TOLD ME THAT THE REASON HIS INCISION BLED IS
BECAUSE HE HAD TO  PUT "ALOT OF PRESSURE"  ON THAT LEG TO GET UP FROM THE
TOILET.  I ASKED HIM WHY HE DIDN'T ASK FOR HELP AND HE BLEW OFF AS "I'M A MAN
FROM [place] AND WE DON'T ASK FOR HELP"I LET HIM KNOW THAT HE'D HAD MAJOR
SURGERY AND HE HAD BETTER START ASKING FOR HELP IF HE WANTED THINGS TO GO
SMOOTHLY WITH HIS SURGERY. I EXPLAINED THAT HE'D CAUSED A LITTLE POST OP
BLEEDING, WHICH WAS SERIOUS.  I ASKED HIM TO PLEASE CALL FOR HELP IN THE
FUTURE.  HE SAID HE HATED TO BE A BOTHER.  I REPLIED THAT WE'RE HERE TO HELP
HIM AND KEEP HIM SAFE.

## 2020-09-16 NOTE — NUR
PT RESTING QUIETLY IN BED.  NO ACUTE DISTRESS NOTED.  DENIES NEED FOR PAIN
MEDICATION AT THIS TIME.  ENCOURAGED TO CALL WITH NEEDS.  CL WITHIN REACH.

## 2020-09-16 NOTE — NUR
PT RESTING QUIETLY IN BED.  RESP EVEN AND UNLABORED.  O2 @ 2L NC IN PLACE.  PT
REPORTS PAIN 2/10 AT THIS TIME, DENIES NEED FOR PAIN MEDICATION.  IV TO LEFT
HAND, SALINE LOC'D.  DRESSING TO RIGHT KNEE INTACT, QUARTER SIZED SPOT OF
DRIED BLOOD NOTED TO ACE BANDAGE.  DRESSING TO BE CHANGED PER MD ORDERS.
DENIES FURTHER NEEDS AT THIS TIME.  CL WITHIN REACH.  ENCOURAGED TO CALL WITH
NEEDS.  CONTINUE POC

## 2020-09-16 NOTE — OP
PATIENT NAME:  LACEY AVINA                   MEDICAL RECORD: [id number]
:44                                             LOCATION:D.     D.1213
                                                         ADMISSION DATE:09/15/20
SURGEON:  BACILIO TRIMBLE DO         
 
 
DATE OF OPERATION:  09/15/2020
 
PROCEDURE PERFORMED:  Left revision total knee.
 
PREOPERATIVE DIAGNOSIS:  Left knee instability.
 
POSTOPERATIVE DIAGNOSIS:  Left knee instability.
 
INDICATIONS:  Mr. Avina is a 80-year-old male who has complained of
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  He feels like they are loose. 
He had both knees replaced approximately 15 years ago and got to the point where
he wants something done surgically as he had been wearing braces to keep it from
giving out on him.  I informed him that we would try to switch out the poly or
the plastic piece to give him more stability rather than having to do a full
revision.  His workup was negative for infection as well as loosening of the
components.  He is okay with that and he was aware of the risks including
infection, bleeding, damage to nerves and vessels, need for further surgery,
continued pain, further instability, blood clots, and even death.  They signed
the consent.
 
SURGEON:  Bacilio Trimble DO
 
DESCRIPTION OF PROCEDURE:  The patient was taken to the operative suite, given a
block per anesthesia in the preoperative area, laid in the supine position,
given general anesthetic and intubated.  He was given 2 grams of Ancef and 80 mg
gentamicin and a gram of TXA prior to starting.  The left lower extremity was
prepped and draped in sterile fashion.  A timeout was performed; everyone was in
agreement with the correct side, site, patient and procedure.  I then began by
marking out over the old incision covered in Ioban and then used a 10 blade
scalpel to go down through the skin to the capsule.  Used a fresh 10 blade, did
a medial parapatellar approach through the capsule then brought the knee to the
extension, removed the clip that held in the poly and popped out the old poly. 
Old poly was a 12 and trialed a 14; 14 fit very well.  He got good range of
motion in extension and flexion without compromise and stability.  Decided to go
with 14 since it fit very well and it was very stable in flexion, mid flexion,
and extension.  I then put in the 14 E poly and then locked it into place with a
locking bar.  I then again ranged it to double check as we did not use a post or
posterior stabilized poly for the trial.  It fit very well and had good
stability in flexion and extension as well as mid flexion.  It was not unstable
at all.  We then irrigated with 10% povidone-iodine with 500 mL normal saline
solution and lasted for 3 minutes and irrigated out with over a liter of normal
saline and put in Ernestina and vancomycin and tobramycin powder.  We then closed
the capsule with #1 Vicryl pop offs in figure-of-eight fashion by myself.  Zaid Aguero, certified surgical assistant and Yu Ortiz certified surgical first
assist student then Zaid and April closed the skin with 2-0 Vicryl in inverted
interrupted fashion and a ZipLine placed on the knee.  He was then dressed with
Adaptic, 4 x 4s, ABD, Webril, Ace wrap, and ARETHA hose stocking up to the knee. 
He was awakened and taken to recovery in stable condition.
 
BLOOD LOSS:  100 mL.
 
COMPLICATIONS:  None.
 
 
 
OPERATIVE REPORT                               [id number]    LACEY AVINA 
 
 
 
TRANSINT:[id number] Voice Confirmation ID: [id number] DOCUMENT ID: [id number]
2020 Edited for judith smith. 
                                           
                                           BACILIO TRIMBLE DO         
 
 
 
Electronically Signed by BACILIO MUELLER on 20 at 0822
 
 
 
 
 
 
 
 
 
 
 
 
 
 
 
 
 
 
 
 
 
 
 
 
 
 
 
 
 
 
 
 
 
 
 
 
 
 
CC:                                                             [id number]
DICTATION DATE: 09/15/20 1104     :     09/15/20 2050      [facility] [place]  
                                                                              
[facility]                                          
[address] [place] [address]

## 2020-09-16 NOTE — NUR
PT IS AWAKE READING IN BED.  CPM STILL GOING.  NO C/O OF NEEDS AT THIS TIME.
PT REFUSES ANY PAIN MEDS.

## 2020-09-16 NOTE — NUR
PT REMAINS UP IN CHAIR AT BEDSIDE.  REPORTS PAIN 2/10 AT THIS TIME.  CONSUMING
LUNCH.  DENIES FURTHER NEEDS AT THIS TIME.  CL WITHIN REACH.  ENCOURAGED TO
CALL WITH NEEDS.

## 2020-09-17 VITALS — DIASTOLIC BLOOD PRESSURE: 75 MMHG | SYSTOLIC BLOOD PRESSURE: 129 MMHG

## 2020-09-17 VITALS — SYSTOLIC BLOOD PRESSURE: 144 MMHG | DIASTOLIC BLOOD PRESSURE: 67 MMHG

## 2020-09-17 VITALS — SYSTOLIC BLOOD PRESSURE: 169 MMHG | DIASTOLIC BLOOD PRESSURE: 84 MMHG

## 2020-09-17 VITALS — DIASTOLIC BLOOD PRESSURE: 78 MMHG | SYSTOLIC BLOOD PRESSURE: 163 MMHG

## 2020-09-17 LAB
ALBUMIN SERPL-MCNC: 3.3 G/DL (ref 3.4–5)
ALP SERPL-CCNC: 62 U/L (ref 30–120)
ALT SERPL-CCNC: 17 U/L (ref 10–68)
ANION GAP SERPL CALC-SCNC: 11.3 MMOL/L (ref 8–16)
BASOPHILS NFR BLD AUTO: 0.2 % (ref 0–2)
BILIRUB SERPL-MCNC: 0.6 MG/DL (ref 0.2–1.3)
BUN SERPL-MCNC: 19 MG/DL (ref 7–18)
CALCIUM SERPL-MCNC: 8.6 MG/DL (ref 8.5–10.1)
CHLORIDE SERPL-SCNC: 109 MMOL/L (ref 98–107)
CO2 SERPL-SCNC: 27.4 MMOL/L (ref 21–32)
CREAT SERPL-MCNC: 1 MG/DL (ref 0.6–1.3)
EOSINOPHIL NFR BLD: 1.2 % (ref 0–7)
ERYTHROCYTE [DISTWIDTH] IN BLOOD BY AUTOMATED COUNT: 14 % (ref 11.5–14.5)
GLOBULIN SER-MCNC: 2.7 G/L
GLUCOSE SERPL-MCNC: 99 MG/DL (ref 74–106)
HCT VFR BLD CALC: 41.2 % (ref 42–54)
HGB BLD-MCNC: 13.7 G/DL (ref 13.5–17.5)
IMM GRANULOCYTES NFR BLD: 0.3 % (ref 0–5)
LYMPHOCYTES NFR BLD AUTO: 18.2 % (ref 15–50)
MCH RBC QN AUTO: 33.7 PG (ref 26–34)
MCHC RBC AUTO-ENTMCNC: 33.3 G/DL (ref 31–37)
MCV RBC: 101.5 FL (ref 80–100)
MONOCYTES NFR BLD: 11 % (ref 2–11)
NEUTROPHILS NFR BLD AUTO: 69.1 % (ref 40–80)
OSMOLALITY SERPL CALC.SUM OF ELEC: 288 MOSM/KG (ref 275–300)
PLATELET # BLD: 195 10X3/UL (ref 130–400)
PMV BLD AUTO: 11 FL (ref 7.4–10.4)
POTASSIUM SERPL-SCNC: 3.7 MMOL/L (ref 3.5–5.1)
PROT SERPL-MCNC: 6 G/DL (ref 6.4–8.2)
RBC # BLD AUTO: 4.06 10X6/UL (ref 4.2–6.1)
SODIUM SERPL-SCNC: 144 MMOL/L (ref 136–145)
WBC # BLD AUTO: 10.5 10X3/UL (ref 4.8–10.8)

## 2020-09-17 NOTE — NUR
PT RESTING IN BED WITH EYES CLOSED.  CPM IN PLACE TO RIGHT LOWER EXTREMITY.
PT CASSIE WELL.  DENIES PAIN AT THIS TIME.  SALINE LOC TO LEFT HAND, SITE WITHOUT
REDNESS OR EDEMA.  CPM REMOVED AT THIS TIME.  PT ASSISTED TO BATHROOM AND BACK
TO BED USING WALKER.  PT CASSIE WELL.  DENIES FURTHER NEEDS AT THIS TIME.  CL
WITHIN REACH.  ENCOURAGE TO CALL WITH NEEDS.  CONTINUE POC

## 2020-09-17 NOTE — NUR
ALERT RESTING IN BED CALL LIGHT IN REACH, DENIES PAIN OR NEEDS AT THIS TIME,
CALL LIGHT IN REACH, CPM IN USE

## 2020-09-18 VITALS — SYSTOLIC BLOOD PRESSURE: 158 MMHG | DIASTOLIC BLOOD PRESSURE: 77 MMHG

## 2020-09-18 VITALS — DIASTOLIC BLOOD PRESSURE: 81 MMHG | SYSTOLIC BLOOD PRESSURE: 189 MMHG

## 2020-09-18 VITALS — DIASTOLIC BLOOD PRESSURE: 78 MMHG | SYSTOLIC BLOOD PRESSURE: 148 MMHG

## 2020-09-18 LAB
ALBUMIN SERPL-MCNC: 3.5 G/DL (ref 3.4–5)
ALP SERPL-CCNC: 70 U/L (ref 30–120)
ALT SERPL-CCNC: 18 U/L (ref 10–68)
ANION GAP SERPL CALC-SCNC: 12.1 MMOL/L (ref 8–16)
BASOPHILS NFR BLD AUTO: 0.2 % (ref 0–2)
BILIRUB SERPL-MCNC: 0.7 MG/DL (ref 0.2–1.3)
BUN SERPL-MCNC: 19 MG/DL (ref 7–18)
CALCIUM SERPL-MCNC: 9.1 MG/DL (ref 8.5–10.1)
CHLORIDE SERPL-SCNC: 105 MMOL/L (ref 98–107)
CO2 SERPL-SCNC: 27.2 MMOL/L (ref 21–32)
CREAT SERPL-MCNC: 1.2 MG/DL (ref 0.6–1.3)
EOSINOPHIL NFR BLD: 1.9 % (ref 0–7)
ERYTHROCYTE [DISTWIDTH] IN BLOOD BY AUTOMATED COUNT: 13.9 % (ref 11.5–14.5)
GLOBULIN SER-MCNC: 3.5 G/L
GLUCOSE SERPL-MCNC: 166 MG/DL (ref 74–106)
HCT VFR BLD CALC: 42.2 % (ref 42–54)
HGB BLD-MCNC: 14 G/DL (ref 13.5–17.5)
IMM GRANULOCYTES NFR BLD: 0.4 % (ref 0–5)
LYMPHOCYTES NFR BLD AUTO: 24.1 % (ref 15–50)
MCH RBC QN AUTO: 33.7 PG (ref 26–34)
MCHC RBC AUTO-ENTMCNC: 33.2 G/DL (ref 31–37)
MCV RBC: 101.7 FL (ref 80–100)
MONOCYTES NFR BLD: 9.4 % (ref 2–11)
NEUTROPHILS NFR BLD AUTO: 64 % (ref 40–80)
OSMOLALITY SERPL CALC.SUM OF ELEC: 286 MOSM/KG (ref 275–300)
PLATELET # BLD: 191 10X3/UL (ref 130–400)
PMV BLD AUTO: 10.3 FL (ref 7.4–10.4)
POTASSIUM SERPL-SCNC: 3.3 MMOL/L (ref 3.5–5.1)
PROT SERPL-MCNC: 7 G/DL (ref 6.4–8.2)
RBC # BLD AUTO: 4.15 10X6/UL (ref 4.2–6.1)
SODIUM SERPL-SCNC: 141 MMOL/L (ref 136–145)
WBC # BLD AUTO: 8.3 10X3/UL (ref 4.8–10.8)

## 2020-09-18 NOTE — NUR
SL TO LEFT WRIST D/C WITH CATHETER INTACT. DISCHARGED TO HOME AMBULATORY WITH
WIFE. DISCHARGE INSTRUCTIONS GIVEN BOTH VERBALLY AND WRITTEN. ALL QUESTIONS
ANSWERED. PATIENT AND WIFE VERBALIZED UNDERSTANDING OF SAME. ALL BELONGINGS
WITH PATIENT.

## 2020-09-18 NOTE — MORECARE
CASE MANAGEMENT DISCHARGE SUMMARY
 
 
PATIENT: ALAN AVINA             UNIT: [id number]
ACCOUNT#: [id number]                       ADM DATE: 09/15/20
AGE: 80     : 44  SEX: M            ROOM/BED: D.1213    
AUTHOR: BE STARKS                             PHYSICIAN:                               
 
REFERRING PHYSICIAN: CECY TRIMBLE DO         
DATE OF SERVICE: 20
Discharge Plan
 
 
Patient Name: ALAN AVINA
Facility: [facility]:[place]
Encounter #: [id number]
Medical Record #: [id number]
: 1940
Planned Disposition: Outpatient PT\OT Anticipated Discharge Date: 20
 
Discharge Date: 
Expected LOS: 2
Initial Reviewer: [email or web address]
Initial Review Date: 09/15/2020
Generated: 20   1:21 pm 
Comments
 
DCP- Discharge Planning
 
Updated by [id number]: Martha Matos on 20  11:09 am CT
DC Plan: [facility] #[phone]. Patient's first appointment is Monday 
@1400., per Kenyetta.   
Patient's wife will him to and from therapy. Patient voices no other needs at 
this time.
DCP- Discharge Planning
 
Updated by [id number]: Martha Matos on 9/15/20   3:41 pm CT
DC Plan: [facility], #[phone], Fx: [phone].   
CM verified with patient's wife OP Therapy and she is in agreement to same. 
Faxed required information to Kenyetta, with [facility].  
  
CM met with patient to discuss initial discharge planning. Patient is in 
agreement to proceed with the assessment. Patient reports that he lives at 
home independently with his wife, Meaghan Avina ([phone]). Patient is 
alert/oriented. Stairs/steps:   PCP: Dr. Herrera. Pharmacy: [facility]. 
    Patient states he has been able to obtain all of his prescribed 
medications. HHS: None. DME: CPM, Ice wrap, walker, shower chair. Patient 
gives permission to speak with family members: spouse. Patient is Independent 
with all ADL's, medication management PTA. CM discussed the availability of 
HH, Rehab, SNF, OP Therapy, DME services. Patient would like to use [facility] ([phone]) and feels safe returning to previous environment. Patient 
denies hospitalization within the past 30 days. Patient denies the use of 
community resources PTA. Transportation at time of discharge: Spouse, Meaghan. 
CM will follow and assist with DC needs/plans PRN.
  
 
 
 
 
 
 
Coverage Notice
 
Reviewer: [id number] - Martha Matos
 
Notice Issued Date-Time: 09/15/2020  15:57
Notice Type: Patient Choice Letter
 
Notice Delivered To: Patient
Relationship to Patient: Self
Representative Name: Alan Avina
 
Delivery Method: HAND - Hand Delivered
Jeanne Days:
Prior Verbal Notification: 
 
Recipient Understood Notice: Yes
Recipient Signature: Yes
Med Rec Note Co-signed by Attending:
 
Coverage Notice Comment:  [facility] choice signed/given to patient.
Reviewer: [id number] - Ben Garza
 
Notice Issued Date-Time: 2020  13:50
Notice Type: IM Discharge Notice
 
Notice Delivered To: Patient
Relationship to Patient: Self
Representative Name: 
 
Delivery Method: HAND - Hand Delivered
Jeanne Days:
Prior Verbal Notification: 
 
Recipient Understood Notice: Yes
Recipient Signature: Yes
Med Rec Note Co-signed by Attending:
 
Coverage Notice Comment:  IMM explained, understood, copy given to patient, 
and placed on chart.
 
Last DP export: 20  11:12 a
 
Patient Name: ALAN AVINA
Encounter #: [id number]
Page [id number]
 
 
 
 
 
Electronically Signed by BE STARSK on 20 at 1222
 
 
 
 
 
 
**All edits/amendments must be made on the electronic document**
 
DICTATION DATE: 20 1222     : JESSICA  20 1222     
RPT#: [id number]                                DC DATE:        
                                               STATUS: ADM IN  
[facility]
[address] [place] [address]
***END OF REPORT***

## 2020-09-18 NOTE — NUR
AWAKE AND ALERT. ORIENTED X3. NO C/O AT THIS TIME. LUNGS ARE CLEAR
BILATERALLY, NO COUGH NOTED. REPORTS USING IS AS INSTRUCTED. SKIN IS INTACT
WITHOUT REDNESS EXCEPT INCISION TO LEFT KNEE WHICH HAS A DRY INTACT DRESSING
IN PLACE. SL TO LEFT HAND PATENT WITHOUT REDNESS AT INSERTION SITE.BREAKFAST
SERVED IN ROOM. FEEDS SELF. TOOK CPM OFF PER SELF. UP TO BR PER SELF.

## 2020-09-18 NOTE — MORECARE
CASE MANAGEMENT DISCHARGE SUMMARY
 
 
PATIENT: ALAN AVINA             UNIT: [id number]
ACCOUNT#: [id number]                       ADM DATE: 09/15/20
AGE: 80     : 44  SEX: M            ROOM/BED: D.1213    
AUTHOR: BE STARKS                             PHYSICIAN:                               
 
REFERRING PHYSICIAN: CECY TRIMBLE DO         
DATE OF SERVICE: 20
Discharge Plan
 
 
Patient Name: ALAN AVINA
Facility: [facility]:[place]
Encounter #: [id number]
Medical Record #: [id number]
: 1940
Planned Disposition: Outpatient PT\OT Anticipated Discharge Date: 20
 
Discharge Date: 
Expected LOS: 2
Initial Reviewer: [email or web address]
Initial Review Date: 09/15/2020
Generated: 20   1:12 pm 
Comments
 
DCP- Discharge Planning
 
Updated by [id number]: Martha Matos on 20  11:09 am CT
DC Plan: [facility] #[phone]. Patient's first appointment is Monday 
@1400., per Kenyetta.   
Patient's wife will him to and from therapy. Patient voices no other needs at 
this time.
DCP- Discharge Planning
 
Updated by [id number]: Martha Matos on 9/15/20   3:41 pm CT
DC Plan: [facility], #[phone], Fx: [phone].   
CM verified with patient's wife OP Therapy and she is in agreement to same. 
Faxed required information to Kenyetta, with [facility].  
  
CM met with patient to discuss initial discharge planning. Patient is in 
agreement to proceed with the assessment. Patient reports that he lives at 
home independently with his wife, Meaghan Avina ([phone]). Patient is 
alert/oriented. Stairs/steps:   PCP: Dr. Herrera. Pharmacy: [facility]. 
    Patient states he has been able to obtain all of his prescribed 
medications. HHS: None. DME: CPM, Ice wrap, walker, shower chair. Patient 
gives permission to speak with family members: spouse. Patient is Independent 
with all ADL's, medication management PTA. CM discussed the availability of 
HH, Rehab, SNF, OP Therapy, DME services. Patient would like to use [facility] ([phone]) and feels safe returning to previous environment. Patient 
denies hospitalization within the past 30 days. Patient denies the use of 
community resources PTA. Transportation at time of discharge: Spouse, Meaghan. 
CM will follow and assist with DC needs/plans PRN.
  
 
 
 
 
 
 
Coverage Notice
 
Reviewer: [email or web address] - Martha Matos
 
Notice Issued Date-Time: 09/15/2020  15:57
Notice Type: Patient Choice Letter
 
Notice Delivered To: Patient
Relationship to Patient: Self
Representative Name: Alan Avina
 
Delivery Method: HAND - Hand Delivered
Jeanne Days:
Prior Verbal Notification: 
 
Recipient Understood Notice: Yes
Recipient Signature: Yes
Med Rec Note Co-signed by Attending:
 
Coverage Notice Comment:  [facility] choice signed/given to patient.
Reviewer: [email or web address] - Ben Garza
 
Notice Issued Date-Time: 2020  13:50
Notice Type: IM Discharge Notice
 
Notice Delivered To: Patient
Relationship to Patient: Self
Representative Name: 
 
Delivery Method: HAND - Hand Delivered
Jeanne Days:
Prior Verbal Notification: 
 
Recipient Understood Notice: Yes
Recipient Signature: Yes
Med Rec Note Co-signed by Attending:
 
Coverage Notice Comment:  IMM explained, understood, copy given to patient, 
and placed on chart.
 
Last DP export: 9/15/20   4:30 p
 
Patient Name: ALAN AVINA
Encounter #: [id number]
Page [phone]
 
 
 
 
 
Electronically Signed by BE STARKS on 20 at 1212
 
 
 
 
 
 
**All edits/amendments must be made on the electronic document**
 
DICTATION DATE: 20 1212     : JESSICA  20 1212     
RPT#: [id number]                                DC DATE:        
                                               STATUS: ADM IN  
[facility]
[address] [place] [address]
***END OF REPORT***

## 2020-09-21 NOTE — MORECARE
CASE MANAGEMENT DISCHARGE SUMMARY
 
 
PATIENT: ALAN AVINA             UNIT: [id number]
ACCOUNT#: [id number]                       ADM DATE: 09/15/20
AGE: 80     : 44  SEX: M            ROOM/BED: D.1213    
AUTHOR: BE STARKS                             PHYSICIAN:                               
 
REFERRING PHYSICIAN: CECY TRIMBLE DO         
DATE OF SERVICE: 20
Discharge Plan
 
 
Patient Name: ALAN AVINA
Facility: [facility]:[place]
Encounter #: [id number]
Medical Record #: [id number]
: 1940
Planned Disposition: Outpatient PT\OT Anticipated Discharge Date: 20
 
Discharge Date: 2020
Expected LOS: 2
Initial Reviewer: [email or web address]
Initial Review Date: 09/15/2020
Generated: 20  10:03 am 
Comments
 
DCP- Discharge Planning
 
Updated by [id number]: Martha Matos on 20  11:09 am CT
DC Plan: [facility] #[phone]. Patient's first appointment is Monday 
@1400., per Kenyetta.   
Patient's wife will him to and from therapy. Patient voices no other needs at 
this time.
DCP- Discharge Planning
 
Updated by [id number]: Martha Matos on 9/15/20   3:41 pm CT
DC Plan: [facility], #[phone], Fx: [phone].   
CM verified with patient's wife OP Therapy and she is in agreement to same. 
Faxed required information to [place]yetta, with [facility].  
  
CM met with patient to discuss initial discharge planning. Patient is in 
agreement to proceed with the assessment. Patient reports that he lives at 
home independently with his wife, Meaghan Avina ([phone]). Patient is 
alert/oriented. Stairs/steps:   PCP: Dr. Herrera. Pharmacy: [facility]. 
    Patient states he has been able to obtain all of his prescribed 
medications. HHS: None. DME: CPM, Ice wrap, walker, shower chair. Patient 
gives permission to speak with family members: spouse. Patient is Independent 
with all ADL's, medication management PTA. CM discussed the availability of 
HH, Rehab, SNF, OP Therapy, DME services. Patient would like to use [facility] ([phone]) and feels safe returning to previous environment. Patient 
denies hospitalization within the past 30 days. Patient denies the use of 
community resources PTA. Transportation at time of discharge: Spouse, Meaghan. 
CM will follow and assist with DC needs/plans PRN.
  
 
 
 
 
 
 
Coverage Notice
 
Reviewer: [email or web address] Leslie Matos
 
Notice Issued Date-Time: 09/15/2020  15:57
Notice Type: Patient Choice Letter
 
Notice Delivered To: Patient
Relationship to Patient: Self
Representative Name: Alan Avina
 
Delivery Method: HAND - Hand Delivered
Jeanne Days:
Prior Verbal Notification: 
 
Recipient Understood Notice: Yes
Recipient Signature: Yes
Med Rec Note Co-signed by Attending:
 
Coverage Notice Comment:  [facility] choice signed/given to patient.
Reviewer: [email or web address] - Ben Garza
 
Notice Issued Date-Time: 2020  13:50
Notice Type: IM Discharge Notice
 
Notice Delivered To: Patient
Relationship to Patient: Self
Representative Name: 
 
Delivery Method: HAND - Hand Delivered
Jeanne Days:
Prior Verbal Notification: 
 
Recipient Understood Notice: Yes
Recipient Signature: Yes
Med Rec Note Co-signed by Attending:
 
Coverage Notice Comment:  IMM explained, understood, copy given to patient, 
and placed on chart.
 
Last DP export: 20  11:22 a
 
Patient Name: ALAN AVINA
Encounter #: [id number]
Page [phone]
 
 
 
 
 
Electronically Signed by BE STARKS on 20 at 0904
 
 
 
 
 
 
**All edits/amendments must be made on the electronic document**
 
DICTATION DATE: 20 0904     : JESSICA  20 0904     
RPT#: [id number]                                DC DATE:20
                                               STATUS: DIS IN  
[facility]
[address] [place] [address]
***END OF REPORT***

## 2022-04-08 NOTE — NUR
CARE TEAM MEETING:
PATIENT IS DOING WELL IN THERAPY. [place] DISCHARGE DATE IS 1/15/2020. WILL
CONTINUE TO FOLLOW WITH PATIENT. Patient baseline mental status

## 2022-04-29 NOTE — [facility]
CASE MANAGEMENT DISCHARGE SUMMARY
 
 
PATIENT: LACEY AVINA             UNIT: [id number]
ACCOUNT#: [id number]                       ADM DATE: 19
AGE: 79     : 44  SEX: M            ROOM/BED: D.2208    
AUTHOR: BE STARKS                             PHYSICIAN:                               
 
REFERRING PHYSICIAN: CECY TRIMBLE DO         
DATE OF SERVICE: 20
Discharge Plan
 
 
Patient Name: LACEY AVINA
Facility: [facility]
Encounter #: [id number]
Medical Record #: [id number]
: 1940
Planned Disposition: Inpatient Rehab
Anticipated Discharge Date: 1/3/20
 
Discharge Date: 2020
Expected LOS: 3
Initial Reviewer: [email or web address]
Initial Review Date: 2020
Generated: 20  11:38 am 
Comments
 
DCP- Discharge Planning
 
Updated by [email or web address]: Marjorie Sotelo on 1/3/20   3:24 pm CT
I spoke with [facility] in inpatient rehab, they will accept the patient tomorrow.
 Anticipate discharge tomorrow to inpatient rehab.
DCP- Discharge Planning
 
Updated by [email or web address]: Marjorie Sotelo on 1/3/20  10:47 am CT
Patient Name: LACEY AVINA                                     
Admission Status: Elective   
Accout number: [id number]                              
Admission Date: 2019   
: 1940                                                        
Admission Diagnosis:   
Attending: CECY TRIMBLE                                                
Current LOS:  3   
  
Anticipated DC Date: 2020   
Planned Disposition: Inpatient Rehab   
Primary Insurance: MEDICARE A & B   
  
  
 
Discharge Planning Comments: CM met with patient to complete initial dc 
planning assessment.  CM educated patient on the CM role and verbal consent 
given by patient to complete assessment.   Patient lives at home with his 
spouse. He states he is independent with all ADL's and AIDL's. States he 
wears oxygen at night, receives oxygen supplies from [facility] CM discussed 
availability of home health, rehab services, and medical equipment. Patient 
states he would like to go to inpatient rehab at [facility]. I have placed a rehab 
screen and spoke with [facility]. [facility] states they can accept today if 
discharged. I messaged Dr. Trimble of the same. CM will continue to follow 
and will assist as needed with dc plans/needs.    
  
  
  
  
  
  
: Marjorie Sotelo
 DCPIA - Discharge Planning Initial Assessment
 
Updated by [email or web address]: Marjorie Sotelo on 1/3/20  11:45 am
*  Is the patient Alert and Oriented?
Yes
*  How many steps to enter\exit or inside your home? 0/0 *  PCP Dr. Woodard *  Pharmacy
[facility] on [address]
*  Preadmission Environment
Home with Family
*  ADLs
Independent
*  Equipment
Oxygen
*  List name and contact numbers for known caregivers / representatives who 
currently or will assist patient after discharge:
Meaghan Avina - spouse - [phone]
*  Verbal permission to speak to the caregivers and representatives has been 
obtained from the patient.
Yes
*  Community resources currently utilized
None
*  Additional services required to return to the preadmission environment?
Yes
*  Can the patient safely return to the preadmission environment?
Yes
*  Has this patient been hospitalized within the prior 30 days at any 
hospital?
No
 
 
 
 
 
Coverage Notice
 
 
Reviewer: [email or web address] Sotelo
 
Notice Issued Date-Time: 2020  11:47
Notice Type: IM Discharge Notice
 
Notice Delivered To: Patient
Relationship to Patient: Self
Representative Name: 
 
Delivery Method: HAND - Hand Delivered
Jeanne Days:
Prior Verbal Notification: 
 
Recipient Understood Notice: Yes
Recipient Signature: Yes
Med Rec Note Co-signed by Attending:
 
Coverage Notice Comment:  IMM explained, signed, given, copy placed in Mr
Reviewer: [email or web address] Leslie Sotelo
 
Notice Issued Date-Time: 2020  11:47
Notice Type: Patient Choice Letter
 
Notice Delivered To: Patient
Relationship to Patient: Self
Representative Name: 
 
Delivery Method: HAND - Hand Delivered
Jeanne Days:
Prior Verbal Notification: 
 
Recipient Understood Notice: Yes
Recipient Signature: Yes
Med Rec Note Co-signed by Attending:
 
Coverage Notice Comment:  [facility] for [facility] inpatient rehab
 
Last DP export: 1/3/20   3:35 pm
Patient Name: LACEY AVINA
 
Encounter #: [id number]
Page [id number]
 
 
 
 
 
Electronically Signed by BE STARKS on 20 at 1039
 
 
 
 
 
 
**All edits/amendments must be made on the electronic document**
 
DICTATION DATE: 20 1038     : JESSICA  20 1038     
RPT#: [id number]                                DC DATE:20
                                               STATUS: DIS IN  
[facility]
[address] [place] [address]
***END OF REPORT*** show